# Patient Record
Sex: FEMALE | Race: WHITE | NOT HISPANIC OR LATINO | Employment: PART TIME
[De-identification: names, ages, dates, MRNs, and addresses within clinical notes are randomized per-mention and may not be internally consistent; named-entity substitution may affect disease eponyms.]

---

## 2017-08-19 ENCOUNTER — HEALTH MAINTENANCE LETTER (OUTPATIENT)
Age: 42
End: 2017-08-19

## 2018-06-04 ENCOUNTER — COMMUNICATION - HEALTHEAST (OUTPATIENT)
Dept: FAMILY MEDICINE | Facility: CLINIC | Age: 43
End: 2018-06-04

## 2018-08-20 ENCOUNTER — HEALTH MAINTENANCE LETTER (OUTPATIENT)
Age: 43
End: 2018-08-20

## 2019-06-27 ASSESSMENT — MIFFLIN-ST. JEOR: SCORE: 1682.34

## 2019-06-28 ENCOUNTER — ANESTHESIA - HEALTHEAST (OUTPATIENT)
Dept: SURGERY | Facility: HOSPITAL | Age: 44
End: 2019-06-28

## 2019-06-28 ENCOUNTER — SURGERY - HEALTHEAST (OUTPATIENT)
Dept: SURGERY | Facility: HOSPITAL | Age: 44
End: 2019-06-28

## 2019-11-06 ENCOUNTER — HEALTH MAINTENANCE LETTER (OUTPATIENT)
Age: 44
End: 2019-11-06

## 2020-11-29 ENCOUNTER — HEALTH MAINTENANCE LETTER (OUTPATIENT)
Age: 45
End: 2020-11-29

## 2021-05-30 NOTE — ANESTHESIA CARE TRANSFER NOTE
Last vitals:   Vitals:    06/28/19 1753   BP: 107/51   Pulse: 93   Resp: 20   Temp: 37.3  C (99.1  F)   SpO2: 99%     Patient's level of consciousness is drowsy  Spontaneous respirations: yes  Maintains airway independently: yes  Dentition unchanged: yes  Oropharynx: oropharynx clear of all foreign objects    QCDR Measures:  ASA# 20 - Surgical Safety Checklist: WHO surgical safety checklist completed prior to induction    PQRS# 430 - Adult PONV Prevention: 4558F - Pt received => 2 anti-emetic agents (different classes) preop & intraop  ASA# 8 - Peds PONV Prevention: NA - Not pediatric patient, not GA or 2 or more risk factors NOT present  PQRS# 424 - Mariluz-op Temp Management: 4559F - At least one body temp DOCUMENTED => 35.5C or 95.9F within required timeframe  PQRS# 426 - PACU Transfer Protocol: - Transfer of care checklist used  ASA# 14 - Acute Post-op Pain: ASA14B - Patient did NOT experience pain >= 7 out of 10

## 2021-05-30 NOTE — ANESTHESIA PREPROCEDURE EVALUATION
Anesthesia Evaluation      Patient summary reviewed   No history of anesthetic complications     Airway   Mallampati: I  Neck ROM: full   Pulmonary - negative ROS and normal exam                          Cardiovascular - negative ROS and normal exam  Exercise tolerance: > or = 4 METS   Neuro/Psych    (+) depression,     Endo/Other    (+) hypothyroidism,      GI/Hepatic/Renal - negative ROS      Other findings: Missed AB at 7 weeks. H/o depression, not on meds presently.  No labs.        Dental - normal exam                        Anesthesia Plan  Planned anesthetic: MAC    ASA 2     Anesthetic plan and risks discussed with: patient    Post-op plan: routine recovery

## 2021-05-30 NOTE — ANESTHESIA POSTPROCEDURE EVALUATION
Patient: Chantal Whatley  SUCTION CURETTAGE  Anesthesia type: MAC    Patient location: PACU  Last vitals:   Vitals Value Taken Time   /65 6/28/2019  6:10 PM   Temp 37.3  C (99.1  F) 6/28/2019  5:53 PM   Pulse 79 6/28/2019  6:11 PM   Resp 18 6/28/2019  6:11 PM   SpO2 100 % 6/28/2019  6:11 PM   Vitals shown include unvalidated device data.  Post vital signs: stable  Level of consciousness: answers questions readily  Post-anesthesia pain: pain controlled  Post-anesthesia nausea and vomiting: no  Pulmonary: supplemental oxygen  Cardiovascular: stable and blood pressure at baseline  Hydration: adequate  Anesthetic events: no    QCDR Measures:  ASA# 11 - Mariluz-op Cardiac Arrest: ASA11B - Patient did NOT experience unanticipated cardiac arrest  ASA# 12 - Mariluz-op Mortality Rate: ASA12B - Patient did NOT die  ASA# 13 - PACU Re-Intubation Rate: ASA13B - Patient did NOT require a new airway mgmt  ASA# 10 - Composite Anes Safety: ASA10A - No serious adverse event    Additional Notes:

## 2021-05-31 ENCOUNTER — RECORDS - HEALTHEAST (OUTPATIENT)
Dept: ADMINISTRATIVE | Facility: CLINIC | Age: 46
End: 2021-05-31

## 2021-06-01 ENCOUNTER — RECORDS - HEALTHEAST (OUTPATIENT)
Dept: ADMINISTRATIVE | Facility: CLINIC | Age: 46
End: 2021-06-01

## 2021-06-03 VITALS — BODY MASS INDEX: 36.16 KG/M2 | WEIGHT: 225 LBS | HEIGHT: 66 IN

## 2021-06-05 ENCOUNTER — HEALTH MAINTENANCE LETTER (OUTPATIENT)
Age: 46
End: 2021-06-05

## 2021-09-19 ENCOUNTER — HEALTH MAINTENANCE LETTER (OUTPATIENT)
Age: 46
End: 2021-09-19

## 2022-01-09 ENCOUNTER — HEALTH MAINTENANCE LETTER (OUTPATIENT)
Age: 47
End: 2022-01-09

## 2022-03-18 DIAGNOSIS — Z11.59 ENCOUNTER FOR SCREENING FOR OTHER VIRAL DISEASES: Primary | ICD-10-CM

## 2022-04-28 ENCOUNTER — LAB (OUTPATIENT)
Dept: LAB | Facility: CLINIC | Age: 47
End: 2022-04-28
Payer: COMMERCIAL

## 2022-04-28 DIAGNOSIS — Z11.59 ENCOUNTER FOR SCREENING FOR OTHER VIRAL DISEASES: ICD-10-CM

## 2022-04-28 PROCEDURE — U0003 INFECTIOUS AGENT DETECTION BY NUCLEIC ACID (DNA OR RNA); SEVERE ACUTE RESPIRATORY SYNDROME CORONAVIRUS 2 (SARS-COV-2) (CORONAVIRUS DISEASE [COVID-19]), AMPLIFIED PROBE TECHNIQUE, MAKING USE OF HIGH THROUGHPUT TECHNOLOGIES AS DESCRIBED BY CMS-2020-01-R: HCPCS

## 2022-04-28 PROCEDURE — U0005 INFEC AGEN DETEC AMPLI PROBE: HCPCS

## 2022-04-28 NOTE — H&P
2022 Chantal Whatley  1975    HPI: The patient has heavy menstrual bleeding.  The last 1-1/2 years, she has had progressively heavier bleeding with her menses, she passes clots that are quarter sized and has a large gushes of blood.  She states she would soak through a pad in about 90 minutes on the heaviest bleeding days.  She denies dizziness or lightheadedness.  She also has constant pelvic pain throughout her cycle that significantly worsens leading up to and during her menses.  This pain keeps her from sleeping during her menses.  She denies pain with bowel movements or urination.  She states her cycles are usually 28-29 days in length.  Of note she had an episode of Lyme's disease where she had very irregular menses for a period of a few months in 2019.  This was treated with cyclic estrogen and progesterone for 90 days, and she says has had normal cycles since then.    OB history: Term vaginal deliveries in , , , , ; SAB in , ,  (required D&C)  GYN history: Denies history of abnormal Pap smears; denies history of STDs  Medical history: Hypothyroidism, depression  Surgical history cholecystectomy in , D&C in 2019  Family history Father with CLL, mother with colon polyps, denies history of bleeding or clotting disorders, denies history of breast, ovarian, or colon cancer; mother had hysterectomy age 43 her mother pregnancy  Social history: Denies use of tobacco, alcohol, drugs  Medications: Levothyroxine, escitalopram  Allergies: Sulfa (rash)    Objective: Blood pressure 118/79, temp 97.9, pulse 64, weight 225, BMI 36  HEENT: moderate thyromegaly  Lungs: CTAB  Card: NSR, No M/R/G auscultated  Abd: no pain with palpation  Ext: no calf pain or edema  Labs: All recent labs were reviewed with the patient, significant for thyroperoxidase antibodies 367, TSH 3.70  Ultrasound: 2/15/22, significant for endometrial thickness of 14.5 mm which is significantly thickened for  the follicular phase of the cycle.    Assessment/plan: 46-year-old  with abnormal uterine bleeding as heavy menses, dysmenorrhea, recurrent pregnancy loss, thickened endometrium, Hashimoto's hypothyroidism, class II obesity.    We extensively discussed the procedure of diagnostic laparoscopy, endocervical and endometrial cultures, endocervical and endometrial biopsies, hysteroscopy,  possible laparoscopic Carbon dioxide laser surgery of endometriosis, possible laparoscopic appendectomy.    We discussed the risks and benefits of surgery including but not limited to the risks of blood loss (the patient consents to blood transfusion if needed), infection (skin or pelvic), injury to other organs (uterus, fallopian tubes, ovaries, bladder, bowel, ureters, nerves, vessels), hernia, risks of anesthesia (DVT, pulmonary embolism, pneumonia, death).  We discussed the possibility of another surgery if the pathology cannot be adequately treated at the time of the diagnostic surgery.  The patient expressed understanding of the above, all of her questions were answered, and she desires to proceed to surgery.    Rosalinda Coyle MD

## 2022-04-29 LAB — SARS-COV-2 RNA RESP QL NAA+PROBE: NEGATIVE

## 2022-05-01 ENCOUNTER — ANESTHESIA EVENT (OUTPATIENT)
Dept: SURGERY | Facility: HOSPITAL | Age: 47
End: 2022-05-01
Payer: COMMERCIAL

## 2022-05-02 ENCOUNTER — ANESTHESIA (OUTPATIENT)
Dept: SURGERY | Facility: HOSPITAL | Age: 47
End: 2022-05-02
Payer: COMMERCIAL

## 2022-05-02 ENCOUNTER — HOSPITAL ENCOUNTER (OUTPATIENT)
Facility: HOSPITAL | Age: 47
Discharge: HOME | End: 2022-05-02
Attending: OBSTETRICS & GYNECOLOGY | Admitting: OBSTETRICS & GYNECOLOGY
Payer: COMMERCIAL

## 2022-05-02 VITALS
WEIGHT: 219.1 LBS | RESPIRATION RATE: 10 BRPM | TEMPERATURE: 98.2 F | SYSTOLIC BLOOD PRESSURE: 116 MMHG | HEART RATE: 82 BPM | OXYGEN SATURATION: 97 % | BODY MASS INDEX: 35.36 KG/M2 | DIASTOLIC BLOOD PRESSURE: 67 MMHG

## 2022-05-02 DIAGNOSIS — G89.18 POST-OP PAIN: Primary | ICD-10-CM

## 2022-05-02 PROBLEM — N80.00 ENDOMETRIOSIS OF UTERUS: Status: ACTIVE | Noted: 2022-05-02

## 2022-05-02 PROBLEM — N83.8 PARATUBAL CYST: Status: ACTIVE | Noted: 2022-05-02

## 2022-05-02 PROBLEM — N80.30 ENDOMETRIOSIS OF PELVIC PERITONEUM: Status: ACTIVE | Noted: 2022-05-02

## 2022-05-02 PROBLEM — N80.109 ENDOMETRIOSIS, OVARY: Status: ACTIVE | Noted: 2022-05-02

## 2022-05-02 PROBLEM — N71.1 CHRONIC ENDOMETRITIS: Status: ACTIVE | Noted: 2022-05-02

## 2022-05-02 PROBLEM — N80.50: Status: ACTIVE | Noted: 2022-05-02

## 2022-05-02 PROBLEM — N72 ENDOCERVICITIS: Status: ACTIVE | Noted: 2022-05-02

## 2022-05-02 LAB
HCG SERPL-ACNC: <2 MLU/ML (ref 0–4)
HCG UR QL: NEGATIVE
HGB BLD-MCNC: 13 G/DL (ref 11.7–15.7)
Lab: NORMAL
PERFORMING LABORATORY: NORMAL
SPECIMEN STATUS: NORMAL
TEST NAME: NORMAL

## 2022-05-02 PROCEDURE — 87563 M. GENITALIUM AMP PROBE: CPT | Performed by: OBSTETRICS & GYNECOLOGY

## 2022-05-02 PROCEDURE — 84999 UNLISTED CHEMISTRY PROCEDURE: CPT | Performed by: OBSTETRICS & GYNECOLOGY

## 2022-05-02 PROCEDURE — 272N000001 HC OR GENERAL SUPPLY STERILE: Performed by: OBSTETRICS & GYNECOLOGY

## 2022-05-02 PROCEDURE — 250N000011 HC RX IP 250 OP 636: Performed by: OBSTETRICS & GYNECOLOGY

## 2022-05-02 PROCEDURE — 87077 CULTURE AEROBIC IDENTIFY: CPT | Performed by: OBSTETRICS & GYNECOLOGY

## 2022-05-02 PROCEDURE — 258N000001 HC RX 258: Performed by: OBSTETRICS & GYNECOLOGY

## 2022-05-02 PROCEDURE — 87070 CULTURE OTHR SPECIMN AEROBIC: CPT | Performed by: OBSTETRICS & GYNECOLOGY

## 2022-05-02 PROCEDURE — 710N000009 HC RECOVERY PHASE 1, LEVEL 1, PER MIN: Performed by: OBSTETRICS & GYNECOLOGY

## 2022-05-02 PROCEDURE — 250N000009 HC RX 250: Performed by: ANESTHESIOLOGY

## 2022-05-02 PROCEDURE — 710N000012 HC RECOVERY PHASE 2, PER MINUTE: Performed by: OBSTETRICS & GYNECOLOGY

## 2022-05-02 PROCEDURE — 87491 CHLMYD TRACH DNA AMP PROBE: CPT | Performed by: OBSTETRICS & GYNECOLOGY

## 2022-05-02 PROCEDURE — 250N000013 HC RX MED GY IP 250 OP 250 PS 637: Performed by: ANESTHESIOLOGY

## 2022-05-02 PROCEDURE — 250N000011 HC RX IP 250 OP 636: Performed by: ANESTHESIOLOGY

## 2022-05-02 PROCEDURE — 84702 CHORIONIC GONADOTROPIN TEST: CPT | Performed by: OBSTETRICS & GYNECOLOGY

## 2022-05-02 PROCEDURE — 250N000025 HC SEVOFLURANE, PER MIN: Performed by: OBSTETRICS & GYNECOLOGY

## 2022-05-02 PROCEDURE — 360N000076 HC SURGERY LEVEL 3, PER MIN: Performed by: OBSTETRICS & GYNECOLOGY

## 2022-05-02 PROCEDURE — 87102 FUNGUS ISOLATION CULTURE: CPT | Performed by: OBSTETRICS & GYNECOLOGY

## 2022-05-02 PROCEDURE — 87798 DETECT AGENT NOS DNA AMP: CPT | Performed by: OBSTETRICS & GYNECOLOGY

## 2022-05-02 PROCEDURE — 999N000141 HC STATISTIC PRE-PROCEDURE NURSING ASSESSMENT: Performed by: OBSTETRICS & GYNECOLOGY

## 2022-05-02 PROCEDURE — 250N000011 HC RX IP 250 OP 636

## 2022-05-02 PROCEDURE — 36415 COLL VENOUS BLD VENIPUNCTURE: CPT | Performed by: ANESTHESIOLOGY

## 2022-05-02 PROCEDURE — 88305 TISSUE EXAM BY PATHOLOGIST: CPT | Mod: TC | Performed by: OBSTETRICS & GYNECOLOGY

## 2022-05-02 PROCEDURE — 258N000003 HC RX IP 258 OP 636: Performed by: ANESTHESIOLOGY

## 2022-05-02 PROCEDURE — 370N000017 HC ANESTHESIA TECHNICAL FEE, PER MIN: Performed by: OBSTETRICS & GYNECOLOGY

## 2022-05-02 PROCEDURE — 81025 URINE PREGNANCY TEST: CPT | Performed by: OBSTETRICS & GYNECOLOGY

## 2022-05-02 PROCEDURE — 85018 HEMOGLOBIN: CPT | Performed by: ANESTHESIOLOGY

## 2022-05-02 RX ORDER — NALOXONE HYDROCHLORIDE 0.4 MG/ML
0.4 INJECTION, SOLUTION INTRAMUSCULAR; INTRAVENOUS; SUBCUTANEOUS
Status: DISCONTINUED | OUTPATIENT
Start: 2022-05-02 | End: 2022-05-02 | Stop reason: HOSPADM

## 2022-05-02 RX ORDER — EPHEDRINE SULFATE 50 MG/ML
INJECTION, SOLUTION INTRAMUSCULAR; INTRAVENOUS; SUBCUTANEOUS PRN
Status: DISCONTINUED | OUTPATIENT
Start: 2022-05-02 | End: 2022-05-02

## 2022-05-02 RX ORDER — GLYCOPYRROLATE 0.2 MG/ML
INJECTION, SOLUTION INTRAMUSCULAR; INTRAVENOUS PRN
Status: DISCONTINUED | OUTPATIENT
Start: 2022-05-02 | End: 2022-05-02

## 2022-05-02 RX ORDER — ACETAMINOPHEN 325 MG/1
975 TABLET ORAL ONCE
Status: COMPLETED | OUTPATIENT
Start: 2022-05-02 | End: 2022-05-02

## 2022-05-02 RX ORDER — LIDOCAINE 40 MG/G
CREAM TOPICAL
Status: DISCONTINUED | OUTPATIENT
Start: 2022-05-02 | End: 2022-05-02 | Stop reason: HOSPADM

## 2022-05-02 RX ORDER — HYDROMORPHONE HYDROCHLORIDE 1 MG/ML
0.2 INJECTION, SOLUTION INTRAMUSCULAR; INTRAVENOUS; SUBCUTANEOUS EVERY 5 MIN PRN
Status: DISCONTINUED | OUTPATIENT
Start: 2022-05-02 | End: 2022-05-02 | Stop reason: HOSPADM

## 2022-05-02 RX ORDER — PROPOFOL 10 MG/ML
INJECTION, EMULSION INTRAVENOUS PRN
Status: DISCONTINUED | OUTPATIENT
Start: 2022-05-02 | End: 2022-05-02

## 2022-05-02 RX ORDER — BUPIVACAINE HYDROCHLORIDE 2.5 MG/ML
INJECTION, SOLUTION INFILTRATION; PERINEURAL PRN
Status: DISCONTINUED | OUTPATIENT
Start: 2022-05-02 | End: 2022-05-02 | Stop reason: HOSPADM

## 2022-05-02 RX ORDER — KETAMINE HYDROCHLORIDE 10 MG/ML
INJECTION INTRAMUSCULAR; INTRAVENOUS PRN
Status: DISCONTINUED | OUTPATIENT
Start: 2022-05-02 | End: 2022-05-02

## 2022-05-02 RX ORDER — FENTANYL CITRATE 50 UG/ML
25 INJECTION, SOLUTION INTRAMUSCULAR; INTRAVENOUS EVERY 5 MIN PRN
Status: DISCONTINUED | OUTPATIENT
Start: 2022-05-02 | End: 2022-05-02 | Stop reason: HOSPADM

## 2022-05-02 RX ORDER — KETOROLAC TROMETHAMINE 30 MG/ML
INJECTION, SOLUTION INTRAMUSCULAR; INTRAVENOUS PRN
Status: DISCONTINUED | OUTPATIENT
Start: 2022-05-02 | End: 2022-05-02

## 2022-05-02 RX ORDER — OXYCODONE HYDROCHLORIDE 5 MG/1
5 TABLET ORAL EVERY 4 HOURS PRN
Status: DISCONTINUED | OUTPATIENT
Start: 2022-05-02 | End: 2022-05-02 | Stop reason: HOSPADM

## 2022-05-02 RX ORDER — SODIUM CHLORIDE, SODIUM LACTATE, POTASSIUM CHLORIDE, CALCIUM CHLORIDE 600; 310; 30; 20 MG/100ML; MG/100ML; MG/100ML; MG/100ML
INJECTION, SOLUTION INTRAVENOUS CONTINUOUS
Status: DISCONTINUED | OUTPATIENT
Start: 2022-05-02 | End: 2022-05-02 | Stop reason: HOSPADM

## 2022-05-02 RX ORDER — MEPERIDINE HYDROCHLORIDE 25 MG/ML
12.5 INJECTION INTRAMUSCULAR; INTRAVENOUS; SUBCUTANEOUS
Status: DISCONTINUED | OUTPATIENT
Start: 2022-05-02 | End: 2022-05-02 | Stop reason: HOSPADM

## 2022-05-02 RX ORDER — FENTANYL CITRATE 50 UG/ML
INJECTION, SOLUTION INTRAMUSCULAR; INTRAVENOUS PRN
Status: DISCONTINUED | OUTPATIENT
Start: 2022-05-02 | End: 2022-05-02

## 2022-05-02 RX ORDER — LIDOCAINE HYDROCHLORIDE 10 MG/ML
INJECTION, SOLUTION INFILTRATION; PERINEURAL PRN
Status: DISCONTINUED | OUTPATIENT
Start: 2022-05-02 | End: 2022-05-02

## 2022-05-02 RX ORDER — ONDANSETRON 2 MG/ML
4 INJECTION INTRAMUSCULAR; INTRAVENOUS EVERY 30 MIN PRN
Status: DISCONTINUED | OUTPATIENT
Start: 2022-05-02 | End: 2022-05-02 | Stop reason: HOSPADM

## 2022-05-02 RX ORDER — PROPOFOL 10 MG/ML
INJECTION, EMULSION INTRAVENOUS CONTINUOUS PRN
Status: DISCONTINUED | OUTPATIENT
Start: 2022-05-02 | End: 2022-05-02

## 2022-05-02 RX ORDER — ONDANSETRON 4 MG/1
4 TABLET, ORALLY DISINTEGRATING ORAL EVERY 30 MIN PRN
Status: DISCONTINUED | OUTPATIENT
Start: 2022-05-02 | End: 2022-05-02 | Stop reason: HOSPADM

## 2022-05-02 RX ORDER — SODIUM CHLORIDE, SODIUM LACTATE, POTASSIUM CHLORIDE, AND CALCIUM CHLORIDE .6; .31; .03; .02 G/100ML; G/100ML; G/100ML; G/100ML
IRRIGANT IRRIGATION PRN
Status: DISCONTINUED | OUTPATIENT
Start: 2022-05-02 | End: 2022-05-02 | Stop reason: HOSPADM

## 2022-05-02 RX ORDER — IBUPROFEN 800 MG/1
800 TABLET, FILM COATED ORAL EVERY 8 HOURS PRN
Qty: 30 TABLET | Refills: 1 | Status: ON HOLD | OUTPATIENT
Start: 2022-05-02 | End: 2022-09-23

## 2022-05-02 RX ORDER — NALOXONE HYDROCHLORIDE 0.4 MG/ML
0.2 INJECTION, SOLUTION INTRAMUSCULAR; INTRAVENOUS; SUBCUTANEOUS
Status: DISCONTINUED | OUTPATIENT
Start: 2022-05-02 | End: 2022-05-02 | Stop reason: HOSPADM

## 2022-05-02 RX ORDER — ACETAMINOPHEN 325 MG/1
975 TABLET ORAL ONCE
Status: DISCONTINUED | OUTPATIENT
Start: 2022-05-02 | End: 2022-05-02 | Stop reason: HOSPADM

## 2022-05-02 RX ORDER — DEXAMETHASONE SODIUM PHOSPHATE 10 MG/ML
INJECTION, SOLUTION INTRAMUSCULAR; INTRAVENOUS PRN
Status: DISCONTINUED | OUTPATIENT
Start: 2022-05-02 | End: 2022-05-02

## 2022-05-02 RX ORDER — FENTANYL CITRATE 50 UG/ML
25 INJECTION, SOLUTION INTRAMUSCULAR; INTRAVENOUS
Status: DISCONTINUED | OUTPATIENT
Start: 2022-05-02 | End: 2022-05-02 | Stop reason: HOSPADM

## 2022-05-02 RX ORDER — ONDANSETRON 2 MG/ML
INJECTION INTRAMUSCULAR; INTRAVENOUS PRN
Status: DISCONTINUED | OUTPATIENT
Start: 2022-05-02 | End: 2022-05-02

## 2022-05-02 RX ORDER — MAGNESIUM SULFATE 4 G/50ML
4 INJECTION INTRAVENOUS ONCE
Status: COMPLETED | OUTPATIENT
Start: 2022-05-02 | End: 2022-05-02

## 2022-05-02 RX ADMIN — FENTANYL CITRATE 25 MCG: 50 INJECTION, SOLUTION INTRAMUSCULAR; INTRAVENOUS at 10:24

## 2022-05-02 RX ADMIN — Medication 5 MG: at 08:31

## 2022-05-02 RX ADMIN — MAGNESIUM SULFATE HEPTAHYDRATE 4 G: 80 INJECTION, SOLUTION INTRAVENOUS at 06:57

## 2022-05-02 RX ADMIN — FENTANYL CITRATE 25 MCG: 50 INJECTION, SOLUTION INTRAMUSCULAR; INTRAVENOUS at 10:29

## 2022-05-02 RX ADMIN — ROCURONIUM BROMIDE 40 MG: 50 INJECTION, SOLUTION INTRAVENOUS at 07:38

## 2022-05-02 RX ADMIN — SUGAMMADEX 200 MG: 100 INJECTION, SOLUTION INTRAVENOUS at 09:23

## 2022-05-02 RX ADMIN — KETOROLAC TROMETHAMINE 15 MG: 30 INJECTION, SOLUTION INTRAMUSCULAR at 09:03

## 2022-05-02 RX ADMIN — ROCURONIUM BROMIDE 10 MG: 50 INJECTION, SOLUTION INTRAVENOUS at 08:34

## 2022-05-02 RX ADMIN — ROCURONIUM BROMIDE 10 MG: 50 INJECTION, SOLUTION INTRAVENOUS at 08:02

## 2022-05-02 RX ADMIN — KETAMINE HYDROCHLORIDE 30 MG: 10 INJECTION, SOLUTION INTRAMUSCULAR; INTRAVENOUS at 07:59

## 2022-05-02 RX ADMIN — PROPOFOL 150 MG: 10 INJECTION, EMULSION INTRAVENOUS at 07:38

## 2022-05-02 RX ADMIN — HYDROMORPHONE HYDROCHLORIDE 0.5 MG: 1 INJECTION, SOLUTION INTRAMUSCULAR; INTRAVENOUS; SUBCUTANEOUS at 08:23

## 2022-05-02 RX ADMIN — OXYCODONE HYDROCHLORIDE 5 MG: 5 TABLET ORAL at 11:35

## 2022-05-02 RX ADMIN — LIDOCAINE HYDROCHLORIDE 5 ML: 10 INJECTION, SOLUTION INFILTRATION; PERINEURAL at 07:38

## 2022-05-02 RX ADMIN — ONDANSETRON 4 MG: 2 INJECTION INTRAMUSCULAR; INTRAVENOUS at 09:00

## 2022-05-02 RX ADMIN — PROPOFOL 20 MCG/KG/MIN: 10 INJECTION, EMULSION INTRAVENOUS at 07:39

## 2022-05-02 RX ADMIN — FENTANYL CITRATE 50 MCG: 50 INJECTION, SOLUTION INTRAMUSCULAR; INTRAVENOUS at 08:24

## 2022-05-02 RX ADMIN — FENTANYL CITRATE 25 MCG: 50 INJECTION, SOLUTION INTRAMUSCULAR; INTRAVENOUS at 10:36

## 2022-05-02 RX ADMIN — ACETAMINOPHEN 975 MG: 325 TABLET ORAL at 07:01

## 2022-05-02 RX ADMIN — SODIUM CHLORIDE, POTASSIUM CHLORIDE, SODIUM LACTATE AND CALCIUM CHLORIDE: 600; 310; 30; 20 INJECTION, SOLUTION INTRAVENOUS at 06:53

## 2022-05-02 RX ADMIN — GLYCOPYRROLATE 0.2 MG: 0.2 INJECTION, SOLUTION INTRAMUSCULAR; INTRAVENOUS at 08:29

## 2022-05-02 RX ADMIN — FENTANYL CITRATE 50 MCG: 50 INJECTION, SOLUTION INTRAMUSCULAR; INTRAVENOUS at 07:38

## 2022-05-02 RX ADMIN — DEXAMETHASONE SODIUM PHOSPHATE 10 MG: 10 INJECTION, SOLUTION INTRAMUSCULAR; INTRAVENOUS at 07:38

## 2022-05-02 RX ADMIN — MIDAZOLAM 2 MG: 1 INJECTION INTRAMUSCULAR; INTRAVENOUS at 07:29

## 2022-05-02 RX ADMIN — Medication 5 MG: at 08:32

## 2022-05-02 RX ADMIN — FENTANYL CITRATE 25 MCG: 50 INJECTION, SOLUTION INTRAMUSCULAR; INTRAVENOUS at 10:18

## 2022-05-02 NOTE — ANESTHESIA PREPROCEDURE EVALUATION
Anesthesia Pre-Procedure Evaluation    Patient: Chantal Whatley   MRN: [id number] : 1975        Procedure : Procedure(s):  DIAGNOSTIC LAPAROSCOPY  ENDOCERVICAL AND ENDOMETRIAL CULTURES, ENDOCERVICAL AND ENDOMETRIAL BIOPSIES,  HYSTEROSCOPY,  POSSIBLE LAPAROSCOPIC CARBON DIOXIDE LASER SURGERY OF ENDOMETRIOSIS,  POSSIBLE LAPAROSCOPIC APPENDECTOMY          Past Medical History:   Diagnosis Date     Depressive disorder, not elsewhere classified      Hypothyroidism      Lyme disease      Obesity       Past Surgical History:   Procedure Laterality Date     CHOLECYSTECTOMY       DILATION AND CURETTAGE N/A 2019    Procedure: SUCTION CURETTAGE;  Surgeon: Connor Martines MD;  Location: [facility] [place];  Service: Gynecology      Allergies   Allergen Reactions     Sulfa Drugs       Social History     Tobacco Use     Smoking status: Never Smoker     Smokeless tobacco: Never Used   Substance Use Topics     Alcohol use: No      Wt Readings from Last 1 Encounters:   22 99.4 kg (219 lb 1.6 oz)        Anesthesia Evaluation   Pt has had prior anesthetic.         ROS/MED HX  ENT/Pulmonary:  - neg pulmonary ROS     Neurologic:  - neg neurologic ROS     Cardiovascular:       METS/Exercise Tolerance:     Hematologic:  - neg hematologic  ROS     Musculoskeletal:  - neg musculoskeletal ROS     GI/Hepatic:  - neg GI/hepatic ROS     Renal/Genitourinary:  - neg Renal ROS     Endo:     (+) thyroid problem, Obesity,     Psychiatric/Substance Use:     (+) psychiatric history depression     Infectious Disease:  - neg infectious disease ROS     Malignancy:       Other:            Physical Exam    Airway  airway exam normal      Mallampati: I   TM distance: > 3 FB   Neck ROM: full   Mouth opening: > 3 cm    Respiratory Devices and Support         Dental  no notable dental history         Cardiovascular   cardiovascular exam normal       Rhythm and rate: regular and normal     Pulmonary   pulmonary exam normal         breath sounds clear to auscultation           OUTSIDE LABS:  CBC:   Lab Results   Component Value Date    WBC 5.6 07/26/2007    HGB 13.0 05/02/2022    HGB 12.4 06/28/2019    HCT 42.7 07/26/2007     07/26/2007     BMP:   Lab Results   Component Value Date     07/26/2007    POTASSIUM 4.0 07/26/2007    CHLORIDE 104 07/26/2007    CO2 27 07/26/2007    BUN 9 07/26/2007    CR 0.80 07/26/2007    GLC 89 07/26/2007     COAGS: No results found for: PTT, INR, FIBR  POC:   Lab Results   Component Value Date    HCG Negative 05/02/2022     HEPATIC:   Lab Results   Component Value Date    ALBUMIN 4.4 07/26/2007    PROTTOTAL 7.4 07/26/2007    ALT 25 07/26/2007    AST 27 07/26/2007    ALKPHOS 114 07/26/2007    BILITOTAL 0.6 07/26/2007     OTHER:   Lab Results   Component Value Date    A1C 5.0 07/01/2014    NADIA 9.4 07/26/2007    TSH 2.36 07/26/2007       Anesthesia Plan    ASA Status:  2   NPO Status:  NPO Appropriate    Anesthesia Type: General.     - Airway: ETT   Induction: Propofol, Intravenous.   Maintenance: Balanced.        Consents    Anesthesia Plan(s) and associated risks, benefits, and realistic alternatives discussed. Questions answered and patient/representative(s) expressed understanding.    - Discussed:     - Discussed with:  Patient, Spouse      - Extended Intubation/Ventilatory Support Discussed: No.         Postoperative Care    Pain management: IV analgesics, Oral pain medications, Multi-modal analgesia.   PONV prophylaxis: Ondansetron (or other 5HT-3), Dexamethasone or Solumedrol, Background Propofol Infusion     Comments:                Nate White MD

## 2022-05-02 NOTE — DISCHARGE INSTRUCTIONS
Discharge Instructions from Dr. Coyle    For Pain:  Take (at the same time or alternating if you prefer) 800 mg of ibuprofen every 8 hours and 1000 mg of Tylenol every 8 hours.   Use a heating pad for shoulder pain if you experience this.  This is from the gas that was used to fill your abdomen during the time of surgery.   Use ice packs on your incisions if they are painful.  Your abdominal muscles may be sore (feeling like to exercised them) for several days follow surgery. This is due to the gas that was used to fill your abdomen at the time of surgery and is a normal sensation following surgery.     Incisions:  The day after the surgery, you can shower and remove the bandages over the incisions and clean the incisions gently with soap and water. No more bandages need to be placed over the incisions.  The incision in or near the bellybutton as well as other incisions should be cleaned daily and dried very well.    The incision in or near the bellybutton may need to be tried with a blow dryer if it cannot be dried completely with a towel.   Keeping the incisions clean and dry will decrease the chance of infection.   It is not required, but some patients have found that an abdominal binder can make the incisions more comfortable with movement.    Activity:  Be sure to walk the length of a football field at least 3 times per day everyday following surgery and at least once the night of surgery. This will help to prevent complications and help you to recover faster.   Restrict lifting to less than 20 pounds for 2 weeks and less than 40 pounds for 4 weeks.  Do not submerge in water (pool or bathtub) for 2 weeks.  Showering is fine and encouraged.   Do not drive while you have significant pain. Before driving, sit in the car with it turned off and press the brakes quickly; if you are able to do this without significant pain, it is ok to drive.   Do not drive if you are taking narcotic pain medications (oxycodone,  hydrocodone); wait until 24 hours after the last dose.  Activities other than those listed as not to do above are okay to do following surgery.    Stool Softener:  If you do not have a regular bowel movement by the day after surgery, please take a stool softener such as colace (docusate sodium) 100mg one to two times per day until you have at least one regular soft bowel movement per day.     Call the after-hours call number ([phone]) if after hours or the office ([phone]) if during hours if you have any concerns or questions including:  Fever of 100.4 Fahrenheit degrees or higher.  Pus draining from the incisions or red incisions (clear fluid or blood from the incisions is okay as long as it is a minimal amount).  Significant swelling in one leg.  Difficulty with urination or unable to urinate for a period of more than 6 hours.  Heavy bleeding greater than the amount of a normal period (spotting for a few days following the procedure is normal due to the biopsies that were done).  Nausea or vomiting that makes you unable to eat for more than a day.     Rosalinda Coyle MD

## 2022-05-02 NOTE — INTERVAL H&P NOTE
I have reviewed the H&P and there are no changes. I discussed the surgical plan with the patient in pre-op, and all of her questions were answered. She desires to proceed with surgery.    Rosalinda Coyle MD

## 2022-05-02 NOTE — ANESTHESIA CARE TRANSFER NOTE
Patient: Chantal Whatley    Procedure: Procedure(s):  DIAGNOSTIC LAPAROSCOPY  ENDOCERVICAL AND ENDOMETRIAL CULTURES, ENDOCERVICAL AND ENDOMETRIAL BIOPSIES,  HYSTEROSCOPY,       Diagnosis: Dysmenorrhea [N94.6]  Abnormal uterine bleeding [N93.9]  Menorrhagia [N92.0]  Recurrent pregnancy loss [N96]  Thickened endometrium [R93.89]  Diagnosis Additional Information: No value filed.    Anesthesia Type:   General     Note:    Oropharynx: oropharynx clear of all foreign objects and spontaneously breathing  Level of Consciousness: drowsy  Oxygen Supplementation: face mask  Level of Supplemental Oxygen (L/min / FiO2): 6  Independent Airway: airway patency satisfactory and stable  Dentition: dentition unchanged  Vital Signs Stable: post-procedure vital signs reviewed and stable  Report to RN Given: handoff report given  Patient transferred to: PACU    Handoff Report: Identifed the Patient, Identified the Reponsible Provider, Reviewed the pertinent medical history, Discussed the surgical course, Reviewed Intra-OP anesthesia mangement and issues during anesthesia, Set expectations for post-procedure period and Allowed opportunity for questions and acknowledgement of understanding      Vitals:  Vitals Value Taken Time   /72    Temp 98.2    Pulse 77 05/02/22 0932   Resp 14    SpO2 100 % 05/02/22 0932   Vitals shown include unvalidated device data.    Electronically Signed By: GIORGIO Rayo CRNA  May 2, 2022  9:34 AM

## 2022-05-02 NOTE — ANESTHESIA PROCEDURE NOTES
Airway       Patient location during procedure: OR       Procedure Start/Stop Times: 5/2/2022 7:41 AM  Staff -        Anesthesiologist:  Nate White MD       CRNA: Pamela Ayon APRN CRNA       Other Anesthesia Staff: Kar Singh       Performed By: SRNA  Consent for Airway        Urgency: elective  Indications and Patient Condition       Indications for airway management: eliceo-procedural       Induction type:intravenous       Mask difficulty assessment: 1 - vent by mask    Final Airway Details       Final airway type: endotracheal airway       Successful airway: ETT - single  Endotracheal Airway Details        ETT size (mm): 7.0       Cuffed: yes       Successful intubation technique: direct laryngoscopy       DL Blade Type: Penny 2       Grade View of Cords: 1       Adjucts: stylet and tooth guard       Position: Right       Measured from: lips       Secured at (cm): 22       Bite block used: None    Post intubation assessment        Placement verified by: capnometry, equal breath sounds and chest rise        Number of attempts at approach: 1       Number of other approaches attempted: 0       Secured with: silk tape       Ease of procedure: easy       Dentition: Intact and Unchanged    Medication(s) Administered   Medication Administration Time: 5/2/2022 7:41 AM

## 2022-05-02 NOTE — OP NOTE
Operative Note   05/02/22     Patient: Chantal Whatley 1975     Procedure: Procedure(s):  DIAGNOSTIC LAPAROSCOPY  ENDOCERVICAL AND ENDOMETRIAL CULTURES, ENDOCERVICAL AND ENDOMETRIAL BIOPSIES,  HYSTEROSCOPY,     Surgeon(s): Rosalinda Coyle MD    Pre-operative Diagnosis:   Dysmenorrhea N94.6  Abnormal uterine bleeding N93.9  Menorrhagia N92.0  Recurrent pregnancy loss N96u  Thickened endometrium R93.89    Post-operative Diagnosis:   Same plus:     Chronic endometritis     Endocervicitis     Paratubal cyst     Endometriosis, ovary     Endometriosis of pelvic peritoneum     Endometriosis of uterus     Endometriosis of large intestine     Anestheisa: general    Antibiotics: none indicated    EBL: 5 mL from 5/2/2022  7:30 AM to 5/2/2022  9:30 AM     UOP: 100cc    IV Fluids: 1000cc    Findings:  Speculum Exam:  Grade II posterior cervical ectropion    Hysteroscopy  Endometrium: diffuse stippling present  Cavity: normal shape  Right tubal ostia: patent  Left tubal ostia: patent  Cervix: cervical crypts present with many micropolyps    Laparoscopy  Uterus: Fundus appears globular and enlarged, red lesion consistent with endometriosis posterior fundus  Right fallopian tube: Normal course, normal fimbriae, 1 cm paratubal cyst, multiple walthard rests  Right ovary: Normal size, surface with rugae, brown vesicular lesions consistent with endometriosis  Right pelvic side wall: Appeared normal  Right broad ligament: Appeared normal  Right uterosacral ligament: Multiple halo lesions and one brown vesicle consistent with endometriosis  Posterior cul-de-sac: Deep pseudodefect in middle superior area  Left uterosacral ligament: Appeared normal   Left pelvic sidewall: large retraction pocket filled with multiple clear vesicle, halo, and brown vesicle lesions consistent with endometriosis that are directly overlying the ureter for several centimeters  Left broad ligament: appeared normal  Left ovary: normal size, surface with  rugae  Left fallopian tube: normal course, normal fimbriae, 1cm left paratubal cyst  Left round ligament: appeared normal  Bladder peritoneum: appeared normal  Right round ligament: appeared normal  Appendix: appeared normal  Cecum: appeared normal  Terminal ileum: appeared normal  Omentum: appeared normal  Gall bladder: surgically absent  Liver: right and left lobes appeared normal  Diaphragm: right and left sides appeared normal  Sigmoid colon: 2 white scars consistent with endometriosis on left epiploica and left serosa  Left pericolic gutter: scar possibly consistent with exaggerated reflection of sigmoid  Right pericolic gutter: white halo consistent with endometriosis  Rectum: appeared normal    Description of Procedure:   After informed consent was obtained, the patient was brought to the operating room.  She was transferred to the operating room table and underwent general anesthesia.  She was then placed in lithotomy with the yellowfin stirrups with arms tucked assuring adequate padding to prevent neurovascular compromise.  The patient was prepped and draped on the abdomen and perineum. A speculum was placed into the vagina and endocervical and endometrial cultures were obtained.  An internal vaginal prep was then performed, and the park catheter was placed. Endocervical and endometrial biopsies were obtained.Hysteroscopy was performed with a hysteroscope which was placed through the cervix into the uterine cavity, and the entirety of the endometrial cavity and endocervix were assessed with the above findings. The hysteroscope was removed. The uterus sounded to 10 cm, and thus the 10 cm Kiki tip was applied to the Kiki uterine manipulator and this was placed in the uterus.  The surgeon's gown and gloves were changed and attention was turned to the laparoscopic portion of the case. An incision was made inferior to the umbilicus, and a Kocher clamp was used to grasp the fascia.  The fascia was grasped  inferior to this and then lateral to this point on both sides with an additional Kocher. With the fascia significantly elevated, an incision was made in the fascia, and 10mm blunt tipped trocar was placed.  Intra-abdominal placement was confirmed with the laparoscope, and the abdominal cavity was insufflated to a pressure of 15 for port placement.      A 5 mm suprapubic port was placed.  The insufflation pressure was reduced to 10. Using a blunt tipped suction  for manipulation, a thorough, close look diagnostic laparoscopy was performed with the above findings. Due to the multiple lesions of endometriosis and large retraction pocket over the left ureter, robotic surgery with the CO2 laser will be performed at a later time for thorough treatment and excision of the pathology.     The intra-abdominal gas was allowed to escape through the trocars including multiple Valsalvas to assist with gas removal.  The trocars were removed. The fascia of the umbilical incision was closed with a figure-of-eight stitch with 0 Vicryl.  The skin incisions were closed with a subcuticular stitch with 4-0 Vicryl. Intradermal local anesthetic was placed in each incision. The incisions were covered with Telfa, a folded 2 x 2 gauze, and a Tegaderm for pressure dressings. The uterine manipulator and Menchaca were removed. Instrument and sponge counts were correct. The patient was cleaned and taken to the recovery room in stable condition. She tolerated the procedure well.    Specimens:    ID Type Source Tests Collected by Time Destination   1 : PLEASE EVALUATE FOR ACUTE AND /OR CHRONIC INFLAMMATION  Biopsy Endocervical/cervical SURGICAL PATHOLOGY EXAM Rosalinda Coyle MD 5/2/2022  8:15 AM    2 : PLEASE EVALUATE FOR CHRONIC ENDOMETRITIS INCLUDING  [facility] Biopsy Endometrium SURGICAL PATHOLOGY EXAM Rosalinda Coyle MD 5/2/2022  8:13 AM    A : ENDOCERVICAL/CERVICAL CULTURE  Swab Endocervical/cervical CHLAMYDIA  TRACHOMATIS/NEISSERIA GONORRHOEAE BY PCR Rosalinda Coyle MD 5/2/2022  7:13 AM    B : [facility] [id number] UROGENITAL UREAPLASMA & MYCOPLASMA PCR Swab Endocervical/cervical LABORATORY MISCELLANEOUS ORDER Rosalinda Coyle MD 5/2/2022  7:15 AM    C : ENDOMETRIUM  Tissue Endometrium ANAEROBIC BACTERIAL CULTURE ROUTINE, FUNGAL OR YEAST CULTURE ROUTINE, AEROBIC BACTERIAL CULTURE ROUTINE Rosalinda Coyle MD 5/2/2022  8:06 AM      Drains: park catheter (removed at conclusion of procedure), uterine manipulator (removed at conclusion of procedure)     Implants:     Complications: none    Rosalinda Coyle MD

## 2022-05-02 NOTE — ANESTHESIA POSTPROCEDURE EVALUATION
Patient: Chantal Whatley    Procedure: Procedure(s):  DIAGNOSTIC LAPAROSCOPY  ENDOCERVICAL AND ENDOMETRIAL CULTURES, ENDOCERVICAL AND ENDOMETRIAL BIOPSIES,  HYSTEROSCOPY,       Anesthesia Type:  General    Note:  Disposition: Outpatient   Postop Pain Control: Uneventful            Sign Out: Well controlled pain   PONV: No   Neuro/Psych: Uneventful            Sign Out: Acceptable/Baseline neuro status   Airway/Respiratory: Uneventful            Sign Out: Acceptable/Baseline resp. status   CV/Hemodynamics: Uneventful            Sign Out: Acceptable CV status; No obvious hypovolemia; No obvious fluid overload   Other NRE: NONE   DID A NON-ROUTINE EVENT OCCUR? No           Last vitals:  Vitals Value Taken Time   /77 05/02/22 1030   Temp 36.8  C (98.2  F) 05/02/22 1030   Pulse 80 05/02/22 1044   Resp 5 05/02/22 1044   SpO2 99 % 05/02/22 1044   Vitals shown include unvalidated device data.    Electronically Signed By: Nate White MD  May 2, 2022  10:56 AM

## 2022-05-03 LAB
C TRACH DNA SPEC QL PROBE+SIG AMP: NEGATIVE
N GONORRHOEA DNA SPEC QL NAA+PROBE: NEGATIVE
PATH REPORT.COMMENTS IMP SPEC: NORMAL
PATH REPORT.COMMENTS IMP SPEC: NORMAL
PATH REPORT.FINAL DX SPEC: NORMAL
PATH REPORT.GROSS SPEC: NORMAL
PATH REPORT.MICROSCOPIC SPEC OTHER STN: NORMAL
PATH REPORT.RELEVANT HX SPEC: NORMAL
PHOTO IMAGE: NORMAL

## 2022-05-03 PROCEDURE — 88305 TISSUE EXAM BY PATHOLOGIST: CPT | Mod: 26 | Performed by: PATHOLOGY

## 2022-05-03 PROCEDURE — 88342 IMHCHEM/IMCYTCHM 1ST ANTB: CPT | Mod: 26 | Performed by: PATHOLOGY

## 2022-05-04 LAB — BACTERIA TISS BX CULT: NORMAL

## 2022-05-05 LAB — MISCELLANEOUS TEST 1 (ARUP): NORMAL

## 2022-05-09 LAB — BACTERIA TISS BX CULT: ABNORMAL

## 2022-05-30 LAB — BACTERIA TISS BX CULT: NO GROWTH

## 2022-06-26 ENCOUNTER — HEALTH MAINTENANCE LETTER (OUTPATIENT)
Age: 47
End: 2022-06-26

## 2022-09-22 ENCOUNTER — ANESTHESIA EVENT (OUTPATIENT)
Dept: SURGERY | Facility: HOSPITAL | Age: 47
End: 2022-09-22
Payer: COMMERCIAL

## 2022-09-22 NOTE — H&P
2022 Chantal Whatley  1975  HPI: The patient has heavy menstrual bleeding. The last 1-1/2 years, she has had progressively heavier bleeding with her menses, she passes clots that are quarter sized and has a large gushes of blood. She states she would soak through a pad in about 90 minutes on the heaviest bleeding days. She denies dizziness or lightheadedness.  She has tried Lysteda, and this somewhat helps, however her bleeding is still very heavy.  She also has constant pelvic pain throughout her cycle that significantly worsens leading up to and during her menses. This pain keeps her from sleeping during her menses. She denies pain with bowel movements or urination. She states her cycles are usually 28-29 days in length. Of note she had an episode of Lyme's disease where she had very irregular menses for a period of a few months in 2019. This was treated with cyclic estrogen and progesterone for 90 days, and she says has had normal cycles since then.  She underwent diagnostic surgery on 22 which revealed chronic endometritis, endocervicitis, paratubal cysts, endometriosis; pathology found benign endocervical and endometrial tissue; cultures revealed normal rafael and lactobacillus.  She returns today for a robotic surgery for treatment of her endometriosis.    OB history: Term vaginal deliveries in , , , , ; SAB in , ,  (required D&C)  GYN history: Denies history of abnormal Pap smears; denies history of STDs  Medical history: Hypothyroidism, depression  Surgical history: cholecystectomy in , D&C in 2019  22 diagnostic laparoscopy, endocervical and endometrial cultures and biopsies, hysteroscopy  Family history: Father with CLL, mother with colon polyps, denies history of bleeding or clotting disorders, denies history of breast, ovarian, or colon cancer; mother had hysterectomy age 43 her mother pregnancy  Social history: Denies use of tobacco, alcohol,  drugs  Medications: Levothyroxine, escitalopram  Allergies: Sulfa (rash)    Objective: 117/78, temp 97.8, pulse 62, weight 232, BMI 37  Physical Exam:  General: No acute distress  HEENT: Slight thyromegaly; no lymphadenopathy palpated  Lungs: CTAB  Cardiac: RRR, no M/R/G auscultated  Abdominal: no pain or organomegaly with palpation  Extremities: no calf pain or edema    Labs: Significant for insulin resistance    Ultrasound 2/15/22 :  Uterus 9.0 x 6.5 x 7.9 cm, endometrial thickness 1.45 cm  Left ovary 2.83 x 2.10 x 1.7 to cm, volume 5.35 mL  Right ovary 3.21 x 2.73 x 1.39 cm, volume 6.38 mL    Assessment/plan 46-year-old  with abnormal uterine bleeding as heavy menses, dysmenorrhea, endometriosis:   We extensively discussed the procedure of robotic guided carbon dioxide laser excision of endometriosis, lysis of adhesions, resection of bilateral paratubal cyst, adhesion prevention measures.    We had discussed the risks and benefits of surgery including but not limited to the risks of blood loss (the patient consents to blood transfusion if needed), infection (skin or pelvic), injury to other organs (uterus, fallopian tubes, ovaries, bladder, bowel, ureters, nerves, vessels), hernia, risks of anesthesia (DVT, pulmonary embolism, pneumonia, death).   We discussed that if the patient has adenomyosis, although removing the endometriosis may help, she may continue to have heavy bleeding in her pain after the surgery, and definitive treatment for adenomyosis would be a hysterectomy.  She expressed understanding and declines having a hysterectomy and would like her endometriosis removed.    Rosalinda Coyle MD

## 2022-09-23 ENCOUNTER — ANESTHESIA (OUTPATIENT)
Dept: SURGERY | Facility: HOSPITAL | Age: 47
End: 2022-09-23
Payer: COMMERCIAL

## 2022-09-23 ENCOUNTER — HOSPITAL ENCOUNTER (OUTPATIENT)
Facility: HOSPITAL | Age: 47
Discharge: HOME | End: 2022-09-23
Attending: OBSTETRICS & GYNECOLOGY | Admitting: OBSTETRICS & GYNECOLOGY
Payer: COMMERCIAL

## 2022-09-23 VITALS
WEIGHT: 225.6 LBS | DIASTOLIC BLOOD PRESSURE: 72 MMHG | SYSTOLIC BLOOD PRESSURE: 124 MMHG | OXYGEN SATURATION: 93 % | HEART RATE: 80 BPM | TEMPERATURE: 97.5 F | RESPIRATION RATE: 16 BRPM | BODY MASS INDEX: 36.41 KG/M2

## 2022-09-23 DIAGNOSIS — G89.18 POST-OP PAIN: ICD-10-CM

## 2022-09-23 LAB
HCG INTACT+B SERPL-ACNC: <1 MIU/ML
HCG UR QL: NEGATIVE

## 2022-09-23 PROCEDURE — 88305 TISSUE EXAM BY PATHOLOGIST: CPT | Mod: TC | Performed by: OBSTETRICS & GYNECOLOGY

## 2022-09-23 PROCEDURE — 81025 URINE PREGNANCY TEST: CPT | Performed by: OBSTETRICS & GYNECOLOGY

## 2022-09-23 PROCEDURE — 258N000003 HC RX IP 258 OP 636: Performed by: ANESTHESIOLOGY

## 2022-09-23 PROCEDURE — 258N000003 HC RX IP 258 OP 636: Performed by: OBSTETRICS & GYNECOLOGY

## 2022-09-23 PROCEDURE — 250N000013 HC RX MED GY IP 250 OP 250 PS 637: Performed by: ANESTHESIOLOGY

## 2022-09-23 PROCEDURE — 84702 CHORIONIC GONADOTROPIN TEST: CPT | Performed by: OBSTETRICS & GYNECOLOGY

## 2022-09-23 PROCEDURE — 370N000017 HC ANESTHESIA TECHNICAL FEE, PER MIN: Performed by: OBSTETRICS & GYNECOLOGY

## 2022-09-23 PROCEDURE — 250N000011 HC RX IP 250 OP 636: Performed by: ANESTHESIOLOGY

## 2022-09-23 PROCEDURE — 710N000009 HC RECOVERY PHASE 1, LEVEL 1, PER MIN: Performed by: OBSTETRICS & GYNECOLOGY

## 2022-09-23 PROCEDURE — 258N000003 HC RX IP 258 OP 636: Performed by: NURSE ANESTHETIST, CERTIFIED REGISTERED

## 2022-09-23 PROCEDURE — 710N000012 HC RECOVERY PHASE 2, PER MINUTE: Performed by: OBSTETRICS & GYNECOLOGY

## 2022-09-23 PROCEDURE — 250N000025 HC SEVOFLURANE, PER MIN: Performed by: OBSTETRICS & GYNECOLOGY

## 2022-09-23 PROCEDURE — 250N000009 HC RX 250: Performed by: NURSE ANESTHETIST, CERTIFIED REGISTERED

## 2022-09-23 PROCEDURE — 999N000141 HC STATISTIC PRE-PROCEDURE NURSING ASSESSMENT: Performed by: OBSTETRICS & GYNECOLOGY

## 2022-09-23 PROCEDURE — 250N000009 HC RX 250: Performed by: OBSTETRICS & GYNECOLOGY

## 2022-09-23 PROCEDURE — 250N000011 HC RX IP 250 OP 636: Performed by: NURSE ANESTHETIST, CERTIFIED REGISTERED

## 2022-09-23 PROCEDURE — 96372 THER/PROPH/DIAG INJ SC/IM: CPT | Performed by: OBSTETRICS & GYNECOLOGY

## 2022-09-23 PROCEDURE — 272N000001 HC OR GENERAL SUPPLY STERILE: Performed by: OBSTETRICS & GYNECOLOGY

## 2022-09-23 PROCEDURE — C1765 ADHESION BARRIER: HCPCS | Performed by: OBSTETRICS & GYNECOLOGY

## 2022-09-23 PROCEDURE — C9290 INJ, BUPIVACAINE LIPOSOME: HCPCS | Performed by: ANESTHESIOLOGY

## 2022-09-23 PROCEDURE — 36415 COLL VENOUS BLD VENIPUNCTURE: CPT | Performed by: OBSTETRICS & GYNECOLOGY

## 2022-09-23 PROCEDURE — 360N000080 HC SURGERY LEVEL 7, PER MIN: Performed by: OBSTETRICS & GYNECOLOGY

## 2022-09-23 PROCEDURE — 250N000011 HC RX IP 250 OP 636: Performed by: OBSTETRICS & GYNECOLOGY

## 2022-09-23 PROCEDURE — 88305 TISSUE EXAM BY PATHOLOGIST: CPT | Mod: 26 | Performed by: PATHOLOGY

## 2022-09-23 RX ORDER — SODIUM CHLORIDE, SODIUM LACTATE, POTASSIUM CHLORIDE, AND CALCIUM CHLORIDE .6; .31; .03; .02 G/100ML; G/100ML; G/100ML; G/100ML
IRRIGANT IRRIGATION PRN
Status: DISCONTINUED | OUTPATIENT
Start: 2022-09-23 | End: 2022-09-23 | Stop reason: HOSPADM

## 2022-09-23 RX ORDER — OXYCODONE HYDROCHLORIDE 5 MG/1
5 TABLET ORAL EVERY 4 HOURS PRN
Status: DISCONTINUED | OUTPATIENT
Start: 2022-09-23 | End: 2022-09-23 | Stop reason: HOSPADM

## 2022-09-23 RX ORDER — FENTANYL CITRATE 50 UG/ML
25 INJECTION, SOLUTION INTRAMUSCULAR; INTRAVENOUS
Status: CANCELLED | OUTPATIENT
Start: 2022-09-23

## 2022-09-23 RX ORDER — ONDANSETRON 2 MG/ML
4 INJECTION INTRAMUSCULAR; INTRAVENOUS EVERY 30 MIN PRN
Status: DISCONTINUED | OUTPATIENT
Start: 2022-09-23 | End: 2022-09-23 | Stop reason: HOSPADM

## 2022-09-23 RX ORDER — ONDANSETRON 2 MG/ML
INJECTION INTRAMUSCULAR; INTRAVENOUS PRN
Status: DISCONTINUED | OUTPATIENT
Start: 2022-09-23 | End: 2022-09-23

## 2022-09-23 RX ORDER — CEFAZOLIN SODIUM/WATER 2 G/20 ML
2 SYRINGE (ML) INTRAVENOUS SEE ADMIN INSTRUCTIONS
Status: DISCONTINUED | OUTPATIENT
Start: 2022-09-23 | End: 2022-09-23 | Stop reason: HOSPADM

## 2022-09-23 RX ORDER — NALOXONE HYDROCHLORIDE 1 MG/ML
0.4 INJECTION INTRAMUSCULAR; INTRAVENOUS; SUBCUTANEOUS
Status: DISCONTINUED | OUTPATIENT
Start: 2022-09-23 | End: 2022-09-23 | Stop reason: HOSPADM

## 2022-09-23 RX ORDER — NALOXONE HYDROCHLORIDE 1 MG/ML
0.2 INJECTION INTRAMUSCULAR; INTRAVENOUS; SUBCUTANEOUS
Status: DISCONTINUED | OUTPATIENT
Start: 2022-09-23 | End: 2022-09-23 | Stop reason: HOSPADM

## 2022-09-23 RX ORDER — ACETAMINOPHEN 325 MG/1
975 TABLET ORAL ONCE
Status: COMPLETED | OUTPATIENT
Start: 2022-09-23 | End: 2022-09-23

## 2022-09-23 RX ORDER — MEPERIDINE HYDROCHLORIDE 25 MG/ML
12.5 INJECTION INTRAMUSCULAR; INTRAVENOUS; SUBCUTANEOUS
Status: DISCONTINUED | OUTPATIENT
Start: 2022-09-23 | End: 2022-09-23 | Stop reason: HOSPADM

## 2022-09-23 RX ORDER — LIDOCAINE 40 MG/G
CREAM TOPICAL
Status: DISCONTINUED | OUTPATIENT
Start: 2022-09-23 | End: 2022-09-23 | Stop reason: HOSPADM

## 2022-09-23 RX ORDER — PROPOFOL 10 MG/ML
INJECTION, EMULSION INTRAVENOUS PRN
Status: DISCONTINUED | OUTPATIENT
Start: 2022-09-23 | End: 2022-09-23

## 2022-09-23 RX ORDER — IBUPROFEN 800 MG/1
800 TABLET, FILM COATED ORAL EVERY 8 HOURS PRN
Qty: 30 TABLET | Refills: 1 | Status: SHIPPED | OUTPATIENT
Start: 2022-09-23

## 2022-09-23 RX ORDER — MAGNESIUM SULFATE 4 G/50ML
4 INJECTION INTRAVENOUS ONCE
Status: COMPLETED | OUTPATIENT
Start: 2022-09-23 | End: 2022-09-23

## 2022-09-23 RX ORDER — GLYCOPYRROLATE 0.2 MG/ML
INJECTION, SOLUTION INTRAMUSCULAR; INTRAVENOUS PRN
Status: DISCONTINUED | OUTPATIENT
Start: 2022-09-23 | End: 2022-09-23

## 2022-09-23 RX ORDER — DEXAMETHASONE SODIUM PHOSPHATE 10 MG/ML
INJECTION, SOLUTION INTRAMUSCULAR; INTRAVENOUS PRN
Status: DISCONTINUED | OUTPATIENT
Start: 2022-09-23 | End: 2022-09-23

## 2022-09-23 RX ORDER — KETOROLAC TROMETHAMINE 30 MG/ML
INJECTION, SOLUTION INTRAMUSCULAR; INTRAVENOUS PRN
Status: DISCONTINUED | OUTPATIENT
Start: 2022-09-23 | End: 2022-09-23

## 2022-09-23 RX ORDER — FENTANYL CITRATE 50 UG/ML
INJECTION, SOLUTION INTRAMUSCULAR; INTRAVENOUS PRN
Status: DISCONTINUED | OUTPATIENT
Start: 2022-09-23 | End: 2022-09-23

## 2022-09-23 RX ORDER — PROPOFOL 10 MG/ML
INJECTION, EMULSION INTRAVENOUS CONTINUOUS PRN
Status: DISCONTINUED | OUTPATIENT
Start: 2022-09-23 | End: 2022-09-23

## 2022-09-23 RX ORDER — ACETAMINOPHEN 325 MG/1
975 TABLET ORAL ONCE
Status: DISCONTINUED | OUTPATIENT
Start: 2022-09-23 | End: 2022-09-23 | Stop reason: HOSPADM

## 2022-09-23 RX ORDER — SODIUM CHLORIDE, SODIUM LACTATE, POTASSIUM CHLORIDE, CALCIUM CHLORIDE 600; 310; 30; 20 MG/100ML; MG/100ML; MG/100ML; MG/100ML
INJECTION, SOLUTION INTRAVENOUS CONTINUOUS
Status: DISCONTINUED | OUTPATIENT
Start: 2022-09-23 | End: 2022-09-23 | Stop reason: HOSPADM

## 2022-09-23 RX ORDER — KETAMINE HYDROCHLORIDE 10 MG/ML
INJECTION INTRAMUSCULAR; INTRAVENOUS PRN
Status: DISCONTINUED | OUTPATIENT
Start: 2022-09-23 | End: 2022-09-23

## 2022-09-23 RX ORDER — FENTANYL CITRATE 50 UG/ML
25 INJECTION, SOLUTION INTRAMUSCULAR; INTRAVENOUS EVERY 5 MIN PRN
Status: DISCONTINUED | OUTPATIENT
Start: 2022-09-23 | End: 2022-09-23 | Stop reason: HOSPADM

## 2022-09-23 RX ORDER — CEFAZOLIN SODIUM/WATER 2 G/20 ML
2 SYRINGE (ML) INTRAVENOUS
Status: COMPLETED | OUTPATIENT
Start: 2022-09-23 | End: 2022-09-23

## 2022-09-23 RX ORDER — LIDOCAINE HYDROCHLORIDE 20 MG/ML
INJECTION, SOLUTION INFILTRATION; PERINEURAL PRN
Status: DISCONTINUED | OUTPATIENT
Start: 2022-09-23 | End: 2022-09-23

## 2022-09-23 RX ORDER — FENTANYL CITRATE 50 UG/ML
100 INJECTION, SOLUTION INTRAMUSCULAR; INTRAVENOUS ONCE
Status: DISCONTINUED | OUTPATIENT
Start: 2022-09-23 | End: 2022-09-23 | Stop reason: HOSPADM

## 2022-09-23 RX ORDER — HYDROMORPHONE HYDROCHLORIDE 1 MG/ML
0.2 INJECTION, SOLUTION INTRAMUSCULAR; INTRAVENOUS; SUBCUTANEOUS EVERY 5 MIN PRN
Status: DISCONTINUED | OUTPATIENT
Start: 2022-09-23 | End: 2022-09-23 | Stop reason: HOSPADM

## 2022-09-23 RX ORDER — ONDANSETRON 4 MG/1
4 TABLET, ORALLY DISINTEGRATING ORAL EVERY 30 MIN PRN
Status: DISCONTINUED | OUTPATIENT
Start: 2022-09-23 | End: 2022-09-23 | Stop reason: HOSPADM

## 2022-09-23 RX ORDER — HEPARIN SODIUM 5000 [USP'U]/.5ML
5000 INJECTION, SOLUTION INTRAVENOUS; SUBCUTANEOUS
Status: COMPLETED | OUTPATIENT
Start: 2022-09-23 | End: 2022-09-23

## 2022-09-23 RX ORDER — BUPIVACAINE HYDROCHLORIDE 2.5 MG/ML
INJECTION, SOLUTION EPIDURAL; INFILTRATION; INTRACAUDAL
Status: COMPLETED | OUTPATIENT
Start: 2022-09-23 | End: 2022-09-23

## 2022-09-23 RX ADMIN — KETAMINE HYDROCHLORIDE 50 MG: 10 INJECTION, SOLUTION INTRAMUSCULAR; INTRAVENOUS at 09:25

## 2022-09-23 RX ADMIN — MIDAZOLAM 2 MG: 1 INJECTION INTRAMUSCULAR; INTRAVENOUS at 08:41

## 2022-09-23 RX ADMIN — DEXAMETHASONE SODIUM PHOSPHATE 10 MG: 10 INJECTION, SOLUTION INTRAMUSCULAR; INTRAVENOUS at 08:52

## 2022-09-23 RX ADMIN — HYDROMORPHONE HYDROCHLORIDE 0.5 MG: 1 INJECTION, SOLUTION INTRAMUSCULAR; INTRAVENOUS; SUBCUTANEOUS at 14:19

## 2022-09-23 RX ADMIN — ACETAMINOPHEN 975 MG: 325 TABLET, FILM COATED ORAL at 07:32

## 2022-09-23 RX ADMIN — MEPERIDINE HYDROCHLORIDE 12.5 MG: 25 INJECTION INTRAMUSCULAR; INTRAVENOUS; SUBCUTANEOUS at 15:02

## 2022-09-23 RX ADMIN — KETOROLAC TROMETHAMINE 15 MG: 30 INJECTION, SOLUTION INTRAMUSCULAR at 13:47

## 2022-09-23 RX ADMIN — PHENYLEPHRINE HYDROCHLORIDE 100 MCG: 10 INJECTION INTRAVENOUS at 13:52

## 2022-09-23 RX ADMIN — SODIUM CHLORIDE, POTASSIUM CHLORIDE, SODIUM LACTATE AND CALCIUM CHLORIDE: 600; 310; 30; 20 INJECTION, SOLUTION INTRAVENOUS at 07:31

## 2022-09-23 RX ADMIN — HYDROMORPHONE HYDROCHLORIDE 0.5 MG: 1 INJECTION, SOLUTION INTRAMUSCULAR; INTRAVENOUS; SUBCUTANEOUS at 12:39

## 2022-09-23 RX ADMIN — ROCURONIUM BROMIDE 10 MG: 50 INJECTION, SOLUTION INTRAVENOUS at 12:05

## 2022-09-23 RX ADMIN — HEPARIN SODIUM 5000 UNITS: 10000 INJECTION, SOLUTION INTRAVENOUS; SUBCUTANEOUS at 07:33

## 2022-09-23 RX ADMIN — BUPIVACAINE 20 ML: 13.3 INJECTION, SUSPENSION, LIPOSOMAL INFILTRATION at 08:54

## 2022-09-23 RX ADMIN — ROCURONIUM BROMIDE 50 MG: 50 INJECTION, SOLUTION INTRAVENOUS at 08:52

## 2022-09-23 RX ADMIN — PROPOFOL 150 MG: 10 INJECTION, EMULSION INTRAVENOUS at 08:51

## 2022-09-23 RX ADMIN — PHENYLEPHRINE HYDROCHLORIDE 100 MCG: 10 INJECTION INTRAVENOUS at 10:13

## 2022-09-23 RX ADMIN — PHENYLEPHRINE HYDROCHLORIDE 100 MCG: 10 INJECTION INTRAVENOUS at 10:52

## 2022-09-23 RX ADMIN — SODIUM CHLORIDE, POTASSIUM CHLORIDE, SODIUM LACTATE AND CALCIUM CHLORIDE: 600; 310; 30; 20 INJECTION, SOLUTION INTRAVENOUS at 09:40

## 2022-09-23 RX ADMIN — PHENYLEPHRINE HYDROCHLORIDE 100 MCG: 10 INJECTION INTRAVENOUS at 09:39

## 2022-09-23 RX ADMIN — Medication 2 G: at 08:42

## 2022-09-23 RX ADMIN — LIDOCAINE HYDROCHLORIDE 60 MG: 20 INJECTION, SOLUTION INFILTRATION; PERINEURAL at 08:50

## 2022-09-23 RX ADMIN — SUGAMMADEX 200 MG: 100 INJECTION, SOLUTION INTRAVENOUS at 14:10

## 2022-09-23 RX ADMIN — ROCURONIUM BROMIDE 20 MG: 50 INJECTION, SOLUTION INTRAVENOUS at 11:12

## 2022-09-23 RX ADMIN — SODIUM CHLORIDE, POTASSIUM CHLORIDE, SODIUM LACTATE AND CALCIUM CHLORIDE: 600; 310; 30; 20 INJECTION, SOLUTION INTRAVENOUS at 12:47

## 2022-09-23 RX ADMIN — ONDANSETRON 2 MG: 2 INJECTION INTRAMUSCULAR; INTRAVENOUS at 13:47

## 2022-09-23 RX ADMIN — BUPIVACAINE HYDROCHLORIDE 30 ML: 2.5 INJECTION, SOLUTION EPIDURAL; INFILTRATION; INTRACAUDAL at 08:54

## 2022-09-23 RX ADMIN — ROCURONIUM BROMIDE 20 MG: 50 INJECTION, SOLUTION INTRAVENOUS at 12:35

## 2022-09-23 RX ADMIN — PHENYLEPHRINE HYDROCHLORIDE 100 MCG: 10 INJECTION INTRAVENOUS at 09:45

## 2022-09-23 RX ADMIN — Medication 2 G: at 12:39

## 2022-09-23 RX ADMIN — GLYCOPYRROLATE 0.2 MG: 0.2 INJECTION, SOLUTION INTRAMUSCULAR; INTRAVENOUS at 10:52

## 2022-09-23 RX ADMIN — ROCURONIUM BROMIDE 20 MG: 50 INJECTION, SOLUTION INTRAVENOUS at 09:52

## 2022-09-23 RX ADMIN — ONDANSETRON 2 MG: 2 INJECTION INTRAMUSCULAR; INTRAVENOUS at 08:52

## 2022-09-23 RX ADMIN — OXYCODONE HYDROCHLORIDE 5 MG: 5 TABLET ORAL at 16:35

## 2022-09-23 RX ADMIN — GLYCOPYRROLATE 0.2 MG: 0.2 INJECTION, SOLUTION INTRAMUSCULAR; INTRAVENOUS at 08:52

## 2022-09-23 RX ADMIN — FENTANYL CITRATE 25 MCG: 50 INJECTION, SOLUTION INTRAMUSCULAR; INTRAVENOUS at 16:38

## 2022-09-23 RX ADMIN — MAGNESIUM SULFATE HEPTAHYDRATE 4 G: 80 INJECTION, SOLUTION INTRAVENOUS at 07:33

## 2022-09-23 RX ADMIN — PHENYLEPHRINE HYDROCHLORIDE 100 MCG: 10 INJECTION INTRAVENOUS at 10:49

## 2022-09-23 RX ADMIN — PHENYLEPHRINE HYDROCHLORIDE 100 MCG: 10 INJECTION INTRAVENOUS at 13:09

## 2022-09-23 RX ADMIN — SODIUM CHLORIDE, POTASSIUM CHLORIDE, SODIUM LACTATE AND CALCIUM CHLORIDE: 600; 310; 30; 20 INJECTION, SOLUTION INTRAVENOUS at 15:39

## 2022-09-23 RX ADMIN — PROPOFOL 30 MCG/KG/MIN: 10 INJECTION, EMULSION INTRAVENOUS at 09:01

## 2022-09-23 RX ADMIN — FENTANYL CITRATE 100 MCG: 50 INJECTION, SOLUTION INTRAMUSCULAR; INTRAVENOUS at 08:50

## 2022-09-23 ASSESSMENT — ACTIVITIES OF DAILY LIVING (ADL)
ADLS_ACUITY_SCORE: 20
ADLS_ACUITY_SCORE: 35
ADLS_ACUITY_SCORE: 20

## 2022-09-23 NOTE — ANESTHESIA PREPROCEDURE EVALUATION
Anesthesia Pre-Procedure Evaluation    Patient: Chantal Whatley   MRN: [id number] : 1975        Procedure : Procedure(s):  DIAGNOSTIC LAPAROSCOPY  ENDOCERVICAL AND ENDOMETRIAL CULTURES, ENDOCERVICAL AND ENDOMETRIAL BIOPSIES,  HYSTEROSCOPY,  POSSIBLE LAPAROSCOPIC CARBON DIOXIDE LASER SURGERY OF ENDOMETRIOSIS,  POSSIBLE LAPAROSCOPIC APPENDECTOMY          Past Medical History:   Diagnosis Date     Depressive disorder, not elsewhere classified      Hypothyroidism      Lyme disease      Obesity       Past Surgical History:   Procedure Laterality Date     CERVICAL BIOPSY N/A 2022    Procedure: ENDOCERVICAL AND ENDOMETRIAL CULTURES, ENDOCERVICAL AND ENDOMETRIAL BIOPSIES,;  Surgeon: Rosalinda Coyle MD;  Location: [facility] OR     CHOLECYSTECTOMY       DILATION AND CURETTAGE N/A 2019    Procedure: SUCTION CURETTAGE;  Surgeon: Connor Martines MD;  Location: [facility];  Service: Gynecology     HYSTEROSCOPY DIAGNOSTIC N/A 2022    Procedure: HYSTEROSCOPY,;  Surgeon: Rosalinda Coyle MD;  Location: [facility]     HYSTEROSCOPY DIAGNOSTIC  2022    Procedure: ;  Surgeon: Rosalinda Coyle MD;  Location: [facility] OR     HYSTEROSCOPY DIAGNOSTIC  2022    Procedure: ;  Surgeon: Rosalinda Coyle MD;  Location: [facility] OR     LAPAROSCOPY DIAGNOSTIC (GYN) N/A 2022    Procedure: DIAGNOSTIC LAPAROSCOPY;  Surgeon: Rosalinda Coyle MD;  Location: [facility]      Allergies   Allergen Reactions     Sulfa Drugs Rash      Social History     Tobacco Use     Smoking status: Never Smoker     Smokeless tobacco: Never Used   Substance Use Topics     Alcohol use: No      Wt Readings from Last 1 Encounters:   22 102.3 kg (225 lb 9.6 oz)        Anesthesia Evaluation   Pt has had prior anesthetic. Type: General.        ROS/MED HX  ENT/Pulmonary:  - neg pulmonary ROS     Neurologic:  - neg neurologic ROS     Cardiovascular:       METS/Exercise  Tolerance:     Hematologic:  - neg hematologic  ROS     Musculoskeletal:  - neg musculoskeletal ROS     GI/Hepatic:  - neg GI/hepatic ROS     Renal/Genitourinary:  - neg Renal ROS     Endo:     (+) thyroid problem, Obesity,     Psychiatric/Substance Use:     (+) psychiatric history depression     Infectious Disease:  - neg infectious disease ROS     Malignancy:       Other:            Physical Exam    Airway  airway exam normal      Mallampati: I   TM distance: > 3 FB   Neck ROM: full   Mouth opening: > 3 cm    Respiratory Devices and Support         Dental  no notable dental history         Cardiovascular   cardiovascular exam normal       Rhythm and rate: regular and normal     Pulmonary   pulmonary exam normal        breath sounds clear to auscultation           OUTSIDE LABS:  CBC:   Lab Results   Component Value Date    WBC 5.6 07/26/2007    HGB 13.0 05/02/2022    HGB 12.4 06/28/2019    HCT 42.7 07/26/2007     07/26/2007     BMP:   Lab Results   Component Value Date     07/26/2007    POTASSIUM 4.0 07/26/2007    CHLORIDE 104 07/26/2007    CO2 27 07/26/2007    BUN 9 07/26/2007    CR 0.80 07/26/2007    GLC 89 07/26/2007     COAGS: No results found for: PTT, INR, FIBR  POC:   Lab Results   Component Value Date    HCG Negative 05/02/2022     HEPATIC:   Lab Results   Component Value Date    ALBUMIN 4.4 07/26/2007    PROTTOTAL 7.4 07/26/2007    ALT 25 07/26/2007    AST 27 07/26/2007    ALKPHOS 114 07/26/2007    BILITOTAL 0.6 07/26/2007     OTHER:   Lab Results   Component Value Date    A1C 5.0 07/01/2014    NADIA 9.4 07/26/2007    TSH 2.36 07/26/2007       Anesthesia Plan    ASA Status:  2   NPO Status:  NPO Appropriate    Anesthesia Type: General.     - Airway: ETT   Induction: Propofol, Intravenous.   Maintenance: Balanced.        Consents    Anesthesia Plan(s) and associated risks, benefits, and realistic alternatives discussed. Questions answered and patient/representative(s) expressed understanding.     - Discussed:     - Discussed with:  Patient      - Extended Intubation/Ventilatory Support Discussed: No.         Postoperative Care    Pain management: IV analgesics, Oral pain medications, Multi-modal analgesia, Peripheral nerve block (Single Shot).   PONV prophylaxis: Ondansetron (or other 5HT-3), Dexamethasone or Solumedrol, Background Propofol Infusion     Comments:    Other Comments: Magnesium, ketamine                Nate White MD

## 2022-09-23 NOTE — OP NOTE
Operative Note   09/23/22    Patient: Chantal Whatley 1975    Procedure: Procedure(s):  ROBOTIC GUIDED CARBON DIOXIDE LASER EXCISION AND VAPORIZATION OF ENDOMETRIOSIS, LYSIS OF ADHESIONS, ENTEROLYSIS, BILATERAL URETEROLYSIS  RESECTION OF BILATERAL PARATUBAL CYSTS, ADHESION PREVENTION MEASURES     Surgeon(s): Rosalinda Coyle MD    Pre-operative Diagnosis:   Abnormal uterine bleeding [N93.9]  Dysmenorrhea [N94.6]  Recurrent pregnancy loss [N96]     Post-operative Diagnosis:   Same plus:     Chronic endometritis     Endocervicitis     Paratubal cyst     Endometriosis, ovary     Endometriosis of pelvic peritoneum     Endometriosis of uterus     Endometriosis, ureter     Endometriosis of large intestine     Anestheisa: general, TAP blocks    Antibiotics: 2g Ancef IV    EBL: 10 mL from 9/23/2022  8:41 AM to 9/23/2022  2:20 PM     UOP: 525cc    IV Fluids: 2300cc    Findings:  Speculum Exam  Grade 2 posterior cervical ectropion    Laparoscopy  Uterus: Fundus appears globular and enlarged, broad superficial red lesion consistent with endometriosis posterior fundus  Right fallopian tube: Normal course, normal fimbriae, 1 cm paratubal cyst, multiple walthard rests  Right ovary: Normal size, surface with rugae, brown vesicular lesions consistent with endometriosis  Right pelvic side wall:  White scarring and clear vesicle over ureter  Right broad ligament: Appeared normal  Right uterosacral ligament: Multiple halo lesions and brown and clear vesicles consistent with endometriosis  Posterior cul-de-sac: Deep pseudodefect in left superior area that abutted the rectum, halo lesion in the right near the uterosacral ligament  Left uterosacral ligament: Appeared normal   Left pelvic sidewall: large retraction pocket filled with multiple clear vesicle, halo, and brown vesicle lesions consistent with endometriosis that are directly overlying the ureter for several centimeters  Left broad ligament: appeared normal  Left ovary:  normal size, surface with rugae, clear vesicles consistent with endometriosis  Left fallopian tube: normal course, normal fimbriae, 1cm left paratubal cyst, multiple walthard rests  Left round ligament: appeared normal  Bladder peritoneum: appeared normal  Right round ligament: appeared normal  Appendix: appeared normal  Cecum: appeared normal  Terminal ileum: appeared normal  Omentum: appeared normal  Gall bladder: surgically absent  Liver: right and left lobes appeared normal  Diaphragm: right and left sides appeared normal  Sigmoid colon: scarred to left pelvic sidewall with overlying red vesicles consistent with endometriosis  Left pericolic gutter: appeared normal after adhesions taken down  Right pericolic gutter: appeared normal  Rectum: appeared normal     Description of Procedure:   After informed consent was obtained, the patient was brought to the operating room.  She was transferred to the operating room table and underwent general anesthesia.  She was then placed in lithotomy with the yellowfin stirrups with arms tucked assuring adequate padding to prevent neurovascular compromise.  The patient was prepped and draped on the abdomen and perineum including internal vaginal prep. A speculum was placed into the vagina. The uterus sounded to 10 cm, and thus the 10 cm Kiki tip was applied to the Kiki uterine manipulator and this was placed in the uterus.  The surgeon's gloves were changed and attention was turned to the laparoscopic portion of the case. An incision was made inferior to the umbilicus in a scar from a previous surgery, and a Kocher clamp was used to grasp the fascia.  The fascia was grasped inferior to this and then lateral to this point on both sides with an additional Kocher. With the fascia significantly elevated, an incision was made in the fascia, and 8mm blunt tipped trocar was placed.  Intra-abdominal placement was confirmed with the laparoscope, and the abdominal cavity was insufflated  to a pressure of 15 for port placement. Additional 8 mm robotic ports were placed in the left lower quadrant, right lower quadrant, and left upper quadrant, and an 8mm Airseal port was placed in the right upper quadrant all under direct visualization assuring the inferior epigastrics were not in the line of the trocar. The insufflation pressure was reduced to 10. The robot was then docked.    The CO2 laser was used to circumscribe and excise lesions of endometriosis, and these were sent to pathology as in the specimen list below. Due to the extensive amount of endometriosis, this dissection and excision required significant time and attention. Careful attention was paid to monitor the course of the ureters and assure that the dissection did not compromise the ureters; especially on the left side where the pseudo defect containing endometriosis was overlying the ureter for several centimeters; both ureters vermiculated normally at the conclusion of the dissections.  The adhesions from the sigmoid to the left pelvic sidewall was taken down, and in doing so, the red vesicles or endometriosis overlying the adhesions were laser vaporized.  The endometriosis in the bilateral ovaries was laser vaporized.  The endometriosis on the posterior aspect of the uterus was laser vaporized.  All of the endometriosis that could be seen was excised or vaporized for full and thorough treatment. All of the significant areas that were dissected or excised were repaired with a running imbricating stitch with 4-0 V lock on the peritoneum and 5-0 PDS to assist with imbrication and hemostasis.  It was assured that all cut edges were imbricated and only smooth surfaces were showing to assist with adhesion prevention.     The bilateral paratubal cysts were excised with the CO2 laser.  The defects on each fallopian tube were closed with an imbricating figure-of-eight stitch with 6-0 PDS assuring no rough edges were visible at the conclusion of  the repair.    Thorough irrigation took place throughout the case, and at the conclusion of the case the irrigation was clear.  The robot was undocked, and the irrigation fluid near the liver was irrigated and suctioned.  A Seprafilm slurry was placed in the pelvis at the conclusion of the surgery to assist with adhesion prevention. The intra-abdominal gas was allowed to escape through the trocars including multiple Valsalvas to assist with gas removal.  The trocars were removed. The fascia of the umbilical incision was closed with a figure-of-eight stitch with 0 Vicryl.  The skin incisions were closed with a subcuticular stitch with 4-0 Vicryl.  The incisions were covered with Telfa, a 2 x 2 gauze folded in fourths, and a Tegaderm for pressure dressings. The uterine manipulator and Menchaca were removed. Instrument and sponge counts were correct. The patient was cleaned and taking to the recovery room in stable condition.  She tolerated the procedure well.     Specimens:    ID Type Source Tests Collected by Time Destination   1 : LEFT POSTERIOR CULDESAC PSEUDO DEFECT WITH ENDOMETRIOSIS Tissue Other SURGICAL PATHOLOGY EXAM Rosalinda Coyle MD 9/23/2022 10:16 AM    2 : RIGHT URETER ENDOMETRIOSIS Tissue Other SURGICAL PATHOLOGY EXAM Rosalinda Coyle MD 9/23/2022 10:22 AM    3 : WHITE LESION ON UTERUS Tissue Other SURGICAL PATHOLOGY EXAM Rosalinda Coyle MD 9/23/2022 10:40 AM    4 : RIGHT UTEROSACRAL LIGAMENT ENDOMETRIOSIS Tissue Other SURGICAL PATHOLOGY EXAM Rosalinda Coyle MD 9/23/2022 11:00 AM    5 : RIGHT UTETEROSACRAL LIGAMENT ENDOMETRIOSIS #2 Tissue Other SURGICAL PATHOLOGY EXAM Rosalinda Coyle MD 9/23/2022 11:08 AM    6 : RIGHT PARATUBAL CYST Cyst Fallopian Tube, Right SURGICAL PATHOLOGY EXAM Rosalinda Coyle MD 9/23/2022 11:20 AM    7 : LEFT PARATUBAL CYST Cyst Fallopian Tube, Left SURGICAL PATHOLOGY EXAM Rosalinda Coyle MD 9/23/2022 11:22 AM    8 : LEFT URETER ENDOMETRIOSIS AND  PSEUDO DEFECT Tissue Ureter, Left SURGICAL PATHOLOGY EXAM Rosalinda Coyle MD 9/23/2022 11:52 AM        Drains: park catheter (removed at conclusion of procedure), uterine manipulator (removed at conclusion of procedure)     Implants: none    Complications: none    Rosalinda Coyle MD

## 2022-09-23 NOTE — OR NURSING
Lab does not have results of HCG blood work done at 0810.  Surgeon informed.  Length of delay unknown at this time.  UPT ordered.

## 2022-09-23 NOTE — ANESTHESIA POSTPROCEDURE EVALUATION
Patient: Chantal Whatley    Procedure: Procedure(s):  ROBOTIC GUIDED CARBON DIOXIDE LASER EXCISION AND VAPORIZATION OF ENDOMETRIOSIS, LYSIS OF ADHESIONS, ENTEROLYSIS, BILATERAL URETEROLYSIS  RESECTION OF BILATERAL PARATUBAL CYSTS, ADHESION PREVENTION MEASURES       Anesthesia Type:  General    Note:  Disposition: Outpatient   Postop Pain Control: Uneventful            Sign Out: Well controlled pain   PONV: No   Neuro/Psych: Uneventful            Sign Out: Acceptable/Baseline neuro status   Airway/Respiratory: Uneventful            Sign Out: Acceptable/Baseline resp. status   CV/Hemodynamics: Uneventful            Sign Out: Acceptable CV status; No obvious hypovolemia; No obvious fluid overload   Other NRE: NONE   DID A NON-ROUTINE EVENT OCCUR? No           Last vitals:  Vitals Value Taken Time   /73 09/23/22 1700   Temp 36.4  C (97.5  F) 09/23/22 1500   Pulse 75 09/23/22 1705   Resp 12 09/23/22 1705   SpO2 94 % 09/23/22 1705   Vitals shown include unvalidated device data.    Electronically Signed By: Lore Burgess MD  September 23, 2022  5:07 PM

## 2022-09-23 NOTE — ANESTHESIA PROCEDURE NOTES
TAP Procedure Note    Pre-Procedure   Staff -        Anesthesiologist:  Nate White MD       Performed By: anesthesiologist       Location: OR       Procedure Start/Stop Times: 9/23/2022 8:54 AM and 9/23/2022 9:00 AM       Pre-Anesthestic Checklist: patient identified, IV checked, site marked, risks and benefits discussed, informed consent, monitors and equipment checked, pre-op evaluation, at physician/surgeon's request and post-op pain management  Timeout:       Correct Patient: Yes        Correct Procedure: Yes        Correct Site: Yes        Correct Position: Yes        Correct Laterality: Yes        Site Marked: Yes  Procedure Documentation  Procedure: TAP       Diagnosis: ENDOMETRIOSIS       Laterality: bilateral       Patient Position: supine       Patient Prep/Sterile Barriers: sterile gloves, mask       Skin prep: Chloraprep       Insertion Site: T9-10.       Needle Type: insulated       Needle Gauge: 20.        Needle Length (Inches): 6        Ultrasound guided       1. Ultrasound was used to identify targeted nerve, plexus, vascular marker, or fascial plane and place a needle adjacent to it in real-time.       2. Ultrasound was used to visualize the spread of anesthetic in close proximity to the above referenced structure.       3. A permanent image is entered into the patient's record.       4. The visualized anatomic structures appeared normal.       5. There were no apparent abnormal pathologic findings.    Assessment/Narrative         The placement was negative for: site bleeding       Bolus given via needle..        Secured via.        Insertion/Infusion Method: Single Shot       Complications: none       Injection made incrementally with aspirations every 5 mL.    Medication(s) Administered   Bupivacaine 0.25% PF (Infiltration) - Infiltration   30 mL - 9/23/2022 8:54:00 AM  Bupivacaine liposome (Exparel) 1.3% LA inj susp (Infiltration) - Infiltration   20 mL - 9/23/2022 8:54:00  AM  Medication Administration Time: 9/23/2022 8:54 AM

## 2022-09-23 NOTE — DISCHARGE INSTRUCTIONS
Discharge Instructions from Dr. Coyle    Pain:  Take (at the same time or alternating if you prefer) 800 mg of ibuprofen every 8 hours and 1000 mg of Tylenol every 8 hours.  Use a heating pad for shoulder pain if you experience this.  This is from the gas that was used to fill your abdomen during the time of surgery.   Use ice packs on your incisions if they are painful.  Your abdominal muscles may be sore (feeling like to exercised them) for several days follow surgery. This is due to the gas that was used to fill your abdomen at the time of surgery and is a normal sensation following surgery.   You may have mild swelling in the face/upper half of the body for the 24 hours following surgery. This is from being in trendelenberg position (upper part of the body tilted slightly down during surgery for best view of the pelvis). The swelling should resolve within 24 hours following surgery.  You may have a sore throat from the breathing tube used during surgery. This should resolve within a few days following surgery.     Of note, the first 2-3 menses and ovulations following surgery may be more painful than they will be in the future once you are completely healed due to the inflammation involved in healing. Consider the 3rd or 4th menses after surgery as the new baseline for you if you are having more pain in the first few cycles.     Incisions:  The day after the surgery, you can shower and remove the bandages over the incisions and clean the incisions gently with soap and water. Bandages do not need to be placed over the incisions after this; the incisions should be open to the air.  The incision in or near the bellybutton as well as other incisions should be cleaned daily and dried very well.    The incision in or near the bellybutton may need to be tried with a blow dryer if it cannot be dried completely with a towel.   Keeping the incisions clean and dry will decrease the chance of infection.   It is not  required, but some patients have found that an abdominal binder can make the incisions more comfortable with movement.    Activity:  Be sure to walk the length of a football field at least 3 times per day everyday following surgery and at least once the night of surgery. This will help to prevent complications and help you to recover faster.   Restrict lifting to less than 20 pounds for 2 weeks and less than 40 pounds for 4 weeks.  Do not submerge in water (bath in bathtub, swimming in pool, swimming in lake, etc.) for 2 weeks.  Showering is fine and encouraged.   Do not drive while you have significant pain. Before driving, sit in the car with it turned off and press the brakes quickly; if you are able to do this without significant pain, it is ok to drive.   Do not drive if you are taking narcotic pain medications (oxycodone, hydrocodone); wait until 24 hours after the last dose.  Activities other than those listed as not to do above are okay to do following surgery.    Stool Softener:  If you do not have a regular bowel movement by the day after surgery, please take a stool softener such as colace (docusate sodium) 100mg one to two times per day until you have at least one regular soft bowel movement per day.     Call the after-hours call number ([phone]) if after hours or the office ([phone]) if during hours if you have any concerns or questions including:  Fever of 100.4 Fahrenheit degrees or higher.  Pus draining from the incisions or red incisions (clear fluid or blood from the incisions is okay as long as it is a minimal amount).  Significant swelling in one leg.  Difficulty with urination or unable to urinate for a period of more than 6 hours.  Heavy bleeding greater than the amount of a normal period (spotting with or without very small pieces of tissue for a few days following the procedure is normal due to the uterine manipulator used; you may also notice a small amount of gel that is used in  placement of the catheter that drains your bladder during surgery).  Nausea or vomiting that makes you unable to eat for more than a day.     Rosalinda Coyle MD

## 2022-09-23 NOTE — ANESTHESIA CARE TRANSFER NOTE
Patient: Chantal Whatley    Procedure: Procedure(s):  ROBOTIC GUIDED CARBON DIOXIDE LASER EXCISION AND VAPORIZATION OF ENDOMETRIOSIS, LYSIS OF ADHESIONS, ENTEROLYSIS, BILATERAL URETEROLYSIS  RESECTION OF BILATERAL PARATUBAL CYSTS, ADHESION PREVENTION MEASURES       Diagnosis: Abnormal uterine bleeding [N93.9]  Dysmenorrhea [N94.6]  Recurrent pregnancy loss [N96]  Diagnosis Additional Information: No value filed.    Anesthesia Type:   General     Note:    Oropharynx: oropharynx clear of all foreign objects  Level of Consciousness: drowsy  Oxygen Supplementation: face mask  Level of Supplemental Oxygen (L/min / FiO2): 8  Independent Airway: airway patency satisfactory and stable  Dentition: dentition unchanged  Vital Signs Stable: post-procedure vital signs reviewed and stable  Report to RN Given: handoff report given  Patient transferred to: PACU    Handoff Report: Identifed the Patient, Identified the Reponsible Provider, Reviewed the pertinent medical history, Discussed the surgical course, Reviewed Intra-OP anesthesia mangement and issues during anesthesia, Set expectations for post-procedure period and Allowed opportunity for questions and acknowledgement of understanding      Vitals:  Vitals Value Taken Time   /63 09/23/22 1423   Temp 35.7  C (96.2  F) 09/23/22 1422   Pulse 67 09/23/22 1426   Resp 17 09/23/22 1426   SpO2 100 % 09/23/22 1426   Vitals shown include unvalidated device data.    Electronically Signed By: GIORGIO Felder CRNA  September 23, 2022  2:28 PM

## 2022-09-23 NOTE — ANESTHESIA PROCEDURE NOTES
Airway       Patient location during procedure: OR       Procedure Start/Stop Times: 9/23/2022 8:53 AM  Staff -        CRNA: Bernadine Key APRN CRNA       Performed By: CRNA  Consent for Airway        Urgency: elective  Indications and Patient Condition       Indications for airway management: eliceo-procedural       Induction type:intravenous       Mask difficulty assessment: 1 - vent by mask    Final Airway Details       Final airway type: endotracheal airway       Successful airway: ETT - single  Endotracheal Airway Details        ETT size (mm): 7.0       Cuffed: yes       Successful intubation technique: direct laryngoscopy       DL Blade Type: Penny 2       Grade View of Cords: 1       Adjucts: stylet       Position: Right       Measured from: lips       Secured at (cm): 21       Bite block used: None    Post intubation assessment        Placement verified by: capnometry, equal breath sounds and chest rise        Number of attempts at approach: 1       Number of other approaches attempted: 0       Secured with: silk tape       Ease of procedure: easy       Dentition: Intact and Unchanged    Medication(s) Administered   Medication Administration Time: 9/23/2022 8:53 AM

## 2022-09-26 LAB
PATH REPORT.COMMENTS IMP SPEC: NORMAL
PATH REPORT.COMMENTS IMP SPEC: NORMAL
PATH REPORT.FINAL DX SPEC: NORMAL
PATH REPORT.GROSS SPEC: NORMAL
PATH REPORT.MICROSCOPIC SPEC OTHER STN: NORMAL
PATH REPORT.RELEVANT HX SPEC: NORMAL
PHOTO IMAGE: NORMAL

## 2022-11-21 ENCOUNTER — HEALTH MAINTENANCE LETTER (OUTPATIENT)
Age: 47
End: 2022-11-21

## 2023-04-16 ENCOUNTER — HEALTH MAINTENANCE LETTER (OUTPATIENT)
Age: 48
End: 2023-04-16

## 2023-07-09 ENCOUNTER — HEALTH MAINTENANCE LETTER (OUTPATIENT)
Age: 48
End: 2023-07-09

## 2024-06-23 ENCOUNTER — HEALTH MAINTENANCE LETTER (OUTPATIENT)
Age: 49
End: 2024-06-23

## 2025-07-12 ENCOUNTER — HEALTH MAINTENANCE LETTER (OUTPATIENT)
Age: 50
End: 2025-07-12

## (undated) DEVICE — ADAPTER DRAPE ALLY AU-AD

## (undated) DEVICE — BARRIER SEPRAFILM 5X6" SINGLE SHEET 4301-02

## (undated) DEVICE — MITT PRE-OP 7 L X 5 1/2 W 5177M1

## (undated) DEVICE — DRSG TEGADERM 2 3/8X2 3/4" 1624W

## (undated) DEVICE — TROCAR ADV FIXATION NONBLADED 5X100MM

## (undated) DEVICE — DECANTER BAG 2002S

## (undated) DEVICE — ENDO OBTURATOR ACCESS PORT BLADELESS 8X100MM IAS8-100LP

## (undated) DEVICE — Device

## (undated) DEVICE — DRSG GAUZE 2X2" 8042

## (undated) DEVICE — PROTECTOR ARM STANDARD ONE STEP

## (undated) DEVICE — UTERINE MANIPULATOR RUMI 6.7MMX10CM UMG670

## (undated) DEVICE — DAVINCI XI SEAL UNIVERSAL 5-8MM 470361

## (undated) DEVICE — SYR 50ML LL W/O NDL 309653

## (undated) DEVICE — SUCTION MANIFOLD NEPTUNE 2 SYS 4 PORT 0702-020-000

## (undated) DEVICE — SU PDS II 5-0 RB-1 DA 30" Z320H

## (undated) DEVICE — PREP SCRUB SOL EXIDINE 4% CHG 4OZ 29002-404

## (undated) DEVICE — SOL RINGERS LACTATED 1000ML BAG 2B2324X

## (undated) DEVICE — DAVINCI XI SUCTION IRRIGATOR ENDOWRIST 480299

## (undated) DEVICE — DAVINCI HOT SHEARS TIP COVER  400180

## (undated) DEVICE — DRSG TELFA 3X4" 1050

## (undated) DEVICE — PLATE GROUNDING ADULT W/CORD 9165L

## (undated) DEVICE — DRAPE IOBAN INCISE 23X17" 6650EZ

## (undated) DEVICE — GOWN IMPERVIOUS BREATHABLE SMART LG 89015

## (undated) DEVICE — SYRINGE 10ML FILL SALINE FLUSH STERILE 306553

## (undated) DEVICE — GLOVE UNDER INDICATOR PI SZ 7.0 LF 41670

## (undated) DEVICE — DAVINCI XI DRAPE ARM 470015

## (undated) DEVICE — SYR 50ML SLIP TIP W/O NDL 309654

## (undated) DEVICE — DRSG TELFA 3X8" 1238

## (undated) DEVICE — TRENGUARD 450 PROCEDURAL PACK 111404

## (undated) DEVICE — PAD POS XL 1X20X40IN PINK PIGAZZI

## (undated) DEVICE — SYR 10ML FINGER CONTROL W/O NDL 309695

## (undated) DEVICE — SOLUTION IRRIG 2B7127 .9NS 3000ML BAG

## (undated) DEVICE — BRIEF STRETCH XL MPS40

## (undated) DEVICE — CATH FOLEY 16FR 5ML LUBRICATH LATEX 0165L16

## (undated) DEVICE — SOL WATER IRRIG 1000ML BOTTLE 2F7114

## (undated) DEVICE — SYR 50ML CATH TIP W/O NDL 309620

## (undated) DEVICE — SYSTEM LAPAROVUE VISIBILITY LAPVUE10

## (undated) DEVICE — DRAPE POUCH INSTRUMENT 3 POCKET 1018L

## (undated) DEVICE — TUBING FILTER TRI-LUMEN AIRSEAL ASC-EVAC1

## (undated) DEVICE — CUSTOM PACK DA VINCI GYN SMA5BDVHEA

## (undated) DEVICE — SYR 10ML LL W/O NDL 302995

## (undated) DEVICE — DRSG STERI STRIP 1/2X4" R1547

## (undated) DEVICE — CATH FOLEY 5CC 16FR SIL/LTX 0165V16S

## (undated) DEVICE — TROCAR ADV FIXATION NONBLADED 11X100MM CFR33

## (undated) DEVICE — PREP CHLORAPREP 26ML TINTED HI-LITE ORANGE 930815

## (undated) DEVICE — DECANTER VIAL 2006S

## (undated) DEVICE — SUTURE VICRYL+ 4-0 UNDYED PS-2 VCP496H

## (undated) DEVICE — LUBRICANT INST ELECTROLUBE EL101

## (undated) DEVICE — BLADE KNIFE SURG 15 371115

## (undated) DEVICE — TUBING SMOKE EVAC PNEUMOCLEAR HIGH FLOW 0620050250

## (undated) DEVICE — CURETTE SUCTION PIPELLE ENDOMETRIAL 3.1MMX23.5CM  8200

## (undated) DEVICE — GOWN IMPERVIOUS BREATHABLE 2XL/XLONG

## (undated) DEVICE — SUCTION MANIFOLD NEPTUNE 2 SYS 1 PORT 702-025-000

## (undated) DEVICE — TUBING LAP SUCT/IRRIG STRYKER 250070500

## (undated) DEVICE — CUSTOM PACK PELVISCOPY SMA5BPVHEA

## (undated) DEVICE — MAT FLOOR SURGICAL 40X38 0702140238

## (undated) DEVICE — BLADE CLIPPER PIVOT PURPLE DISP 9660

## (undated) DEVICE — DAVINCI XI DRAPE COLUMN 470341

## (undated) DEVICE — GOWN SURGICAL SMARTGOWN 2XL 89075

## (undated) DEVICE — KIT PROCEDURE FLUENT IN/OUT FLOWPAK TISS TRAP FLT-112S

## (undated) DEVICE — DRAPE U SPLIT 74X120" 29440

## (undated) DEVICE — ENDO SHEARS RENEW LAP ENDOCUT SCISSOR TIP 16.5MM 3142

## (undated) DEVICE — SU VICRYL+ 0 27 UR6 VLT VCP603H

## (undated) DEVICE — SU WND CLOSURE VLOC 180 ABS 4-0 6" CV-23 VLOCL0803

## (undated) DEVICE — DRSG TEGADERM 2 1/2X 2 3/4"

## (undated) DEVICE — BASIN EMESIS STERILE  SSK9005A

## (undated) RX ORDER — PROPOFOL 10 MG/ML
INJECTION, EMULSION INTRAVENOUS
Status: DISPENSED
Start: 2022-05-02

## (undated) RX ORDER — FENTANYL CITRATE-0.9 % NACL/PF 10 MCG/ML
PLASTIC BAG, INJECTION (ML) INTRAVENOUS
Status: DISPENSED
Start: 2022-09-23

## (undated) RX ORDER — KETOROLAC TROMETHAMINE 30 MG/ML
INJECTION, SOLUTION INTRAMUSCULAR; INTRAVENOUS
Status: DISPENSED
Start: 2022-09-23

## (undated) RX ORDER — ONDANSETRON 2 MG/ML
INJECTION INTRAMUSCULAR; INTRAVENOUS
Status: DISPENSED
Start: 2022-05-02

## (undated) RX ORDER — PROPOFOL 10 MG/ML
INJECTION, EMULSION INTRAVENOUS
Status: DISPENSED
Start: 2022-09-23

## (undated) RX ORDER — DEXAMETHASONE SODIUM PHOSPHATE 10 MG/ML
INJECTION, SOLUTION INTRAMUSCULAR; INTRAVENOUS
Status: DISPENSED
Start: 2022-09-23

## (undated) RX ORDER — BUPIVACAINE HYDROCHLORIDE 5 MG/ML
INJECTION, SOLUTION PERINEURAL
Status: DISPENSED
Start: 2022-05-02

## (undated) RX ORDER — CEFAZOLIN SODIUM 1 G/3ML
INJECTION, POWDER, FOR SOLUTION INTRAMUSCULAR; INTRAVENOUS
Status: DISPENSED
Start: 2022-09-23

## (undated) RX ORDER — BUPIVACAINE HYDROCHLORIDE 5 MG/ML
INJECTION, SOLUTION EPIDURAL; INTRACAUDAL
Status: DISPENSED
Start: 2022-05-02

## (undated) RX ORDER — FENTANYL CITRATE-0.9 % NACL/PF 10 MCG/ML
PLASTIC BAG, INJECTION (ML) INTRAVENOUS
Status: DISPENSED
Start: 2022-05-02

## (undated) RX ORDER — DEXAMETHASONE SODIUM PHOSPHATE 10 MG/ML
INJECTION INTRAMUSCULAR; INTRAVENOUS
Status: DISPENSED
Start: 2022-05-02

## (undated) RX ORDER — KETAMINE HYDROCHLORIDE 10 MG/ML
INJECTION INTRAMUSCULAR; INTRAVENOUS
Status: DISPENSED
Start: 2022-09-23

## (undated) RX ORDER — EPHEDRINE SULFATE 50 MG/ML
INJECTION, SOLUTION INTRAMUSCULAR; INTRAVENOUS; SUBCUTANEOUS
Status: DISPENSED
Start: 2022-05-02

## (undated) RX ORDER — FENTANYL CITRATE 50 UG/ML
INJECTION, SOLUTION INTRAMUSCULAR; INTRAVENOUS
Status: DISPENSED
Start: 2022-09-23

## (undated) RX ORDER — KETAMINE HYDROCHLORIDE 10 MG/ML
INJECTION INTRAMUSCULAR; INTRAVENOUS
Status: DISPENSED
Start: 2022-05-02

## (undated) RX ORDER — BUPIVACAINE HYDROCHLORIDE 2.5 MG/ML
INJECTION, SOLUTION EPIDURAL; INFILTRATION; INTRACAUDAL
Status: DISPENSED
Start: 2022-05-02

## (undated) RX ORDER — ONDANSETRON 2 MG/ML
INJECTION INTRAMUSCULAR; INTRAVENOUS
Status: DISPENSED
Start: 2022-09-23

## (undated) RX ORDER — FENTANYL CITRATE 50 UG/ML
INJECTION, SOLUTION INTRAMUSCULAR; INTRAVENOUS
Status: DISPENSED
Start: 2022-05-02